# Patient Record
Sex: FEMALE | Race: WHITE | NOT HISPANIC OR LATINO | Employment: OTHER | URBAN - METROPOLITAN AREA
[De-identification: names, ages, dates, MRNs, and addresses within clinical notes are randomized per-mention and may not be internally consistent; named-entity substitution may affect disease eponyms.]

---

## 2022-01-01 ENCOUNTER — PATIENT OUTREACH (OUTPATIENT)
Dept: ONCOLOGY | Facility: MEDICAL CENTER | Age: 72
End: 2022-01-01
Payer: MEDICARE

## 2022-01-01 ENCOUNTER — TELEPHONE (OUTPATIENT)
Dept: HEMATOLOGY ONCOLOGY | Facility: MEDICAL CENTER | Age: 72
End: 2022-01-01

## 2022-01-01 ENCOUNTER — HOSPITAL ENCOUNTER (OUTPATIENT)
Dept: HEMATOLOGY ONCOLOGY | Facility: MEDICAL CENTER | Age: 72
End: 2022-10-26
Attending: INTERNAL MEDICINE
Payer: MEDICARE

## 2022-01-01 ENCOUNTER — HOSPITAL ENCOUNTER (OUTPATIENT)
Dept: RADIOLOGY | Facility: MEDICAL CENTER | Age: 72
End: 2022-10-03
Attending: INTERNAL MEDICINE
Payer: MEDICARE

## 2022-01-01 ENCOUNTER — PHYSICAL THERAPY (OUTPATIENT)
Dept: PHYSICAL THERAPY | Facility: REHABILITATION | Age: 72
End: 2022-01-01
Attending: INTERNAL MEDICINE
Payer: MEDICARE

## 2022-01-01 ENCOUNTER — HOSPITAL ENCOUNTER (OUTPATIENT)
Dept: LAB | Facility: MEDICAL CENTER | Age: 72
End: 2022-12-29
Attending: NURSE PRACTITIONER
Payer: MEDICARE

## 2022-01-01 ENCOUNTER — HOSPITAL ENCOUNTER (OUTPATIENT)
Dept: LAB | Facility: MEDICAL CENTER | Age: 72
End: 2022-11-28
Attending: INTERNAL MEDICINE
Payer: MEDICARE

## 2022-01-01 ENCOUNTER — TELEPHONE (OUTPATIENT)
Dept: HEMATOLOGY ONCOLOGY | Facility: MEDICAL CENTER | Age: 72
End: 2022-01-01
Payer: MEDICARE

## 2022-01-01 ENCOUNTER — HOSPITAL ENCOUNTER (OUTPATIENT)
Dept: HEMATOLOGY ONCOLOGY | Facility: MEDICAL CENTER | Age: 72
End: 2022-09-28
Attending: INTERNAL MEDICINE
Payer: MEDICARE

## 2022-01-01 ENCOUNTER — PATIENT MESSAGE (OUTPATIENT)
Dept: HEALTH INFORMATION MANAGEMENT | Facility: OTHER | Age: 72
End: 2022-01-01

## 2022-01-01 ENCOUNTER — HOSPITAL ENCOUNTER (OUTPATIENT)
Dept: HEMATOLOGY ONCOLOGY | Facility: MEDICAL CENTER | Age: 72
End: 2022-12-02
Attending: INTERNAL MEDICINE
Payer: MEDICARE

## 2022-01-01 ENCOUNTER — HOSPITAL ENCOUNTER (OUTPATIENT)
Dept: LAB | Facility: MEDICAL CENTER | Age: 72
End: 2022-10-24
Attending: INTERNAL MEDICINE
Payer: MEDICARE

## 2022-01-01 VITALS
SYSTOLIC BLOOD PRESSURE: 130 MMHG | HEIGHT: 61 IN | TEMPERATURE: 97.4 F | DIASTOLIC BLOOD PRESSURE: 72 MMHG | BODY MASS INDEX: 44.64 KG/M2 | RESPIRATION RATE: 16 BRPM | WEIGHT: 236.44 LBS | OXYGEN SATURATION: 96 % | HEART RATE: 59 BPM

## 2022-01-01 VITALS
RESPIRATION RATE: 18 BRPM | WEIGHT: 237.44 LBS | DIASTOLIC BLOOD PRESSURE: 80 MMHG | OXYGEN SATURATION: 97 % | SYSTOLIC BLOOD PRESSURE: 142 MMHG | HEART RATE: 67 BPM | TEMPERATURE: 96.9 F | HEIGHT: 61 IN | BODY MASS INDEX: 44.83 KG/M2

## 2022-01-01 VITALS
HEIGHT: 61 IN | WEIGHT: 238.32 LBS | DIASTOLIC BLOOD PRESSURE: 91 MMHG | SYSTOLIC BLOOD PRESSURE: 146 MMHG | RESPIRATION RATE: 18 BRPM | BODY MASS INDEX: 44.99 KG/M2 | TEMPERATURE: 98.2 F | HEART RATE: 77 BPM | OXYGEN SATURATION: 97 %

## 2022-01-01 DIAGNOSIS — C79.51 MALIGNANT NEOPLASM OF BREAST METASTATIC TO BONE (HCC): ICD-10-CM

## 2022-01-01 DIAGNOSIS — I89.0 LYMPHEDEMA: ICD-10-CM

## 2022-01-01 DIAGNOSIS — C50.919 MALIGNANT NEOPLASM OF BREAST METASTATIC TO BONE (HCC): Primary | ICD-10-CM

## 2022-01-01 DIAGNOSIS — I82.A22: ICD-10-CM

## 2022-01-01 DIAGNOSIS — C50.919 MALIGNANT NEOPLASM OF BREAST METASTATIC TO BONE (HCC): ICD-10-CM

## 2022-01-01 DIAGNOSIS — C79.51 MALIGNANT NEOPLASM OF BREAST METASTATIC TO BONE (HCC): Primary | ICD-10-CM

## 2022-01-01 DIAGNOSIS — J40 BRONCHITIS: ICD-10-CM

## 2022-01-01 LAB
ALBUMIN SERPL BCP-MCNC: 3.9 G/DL (ref 3.2–4.9)
ALBUMIN SERPL BCP-MCNC: 4 G/DL (ref 3.2–4.9)
ALBUMIN SERPL BCP-MCNC: 4.1 G/DL (ref 3.2–4.9)
ALBUMIN/GLOB SERPL: 1.4 G/DL
ALBUMIN/GLOB SERPL: 1.5 G/DL
ALBUMIN/GLOB SERPL: 1.5 G/DL
ALP SERPL-CCNC: 109 U/L (ref 30–99)
ALP SERPL-CCNC: 94 U/L (ref 30–99)
ALP SERPL-CCNC: 98 U/L (ref 30–99)
ALT SERPL-CCNC: 17 U/L (ref 2–50)
ALT SERPL-CCNC: 20 U/L (ref 2–50)
ALT SERPL-CCNC: 21 U/L (ref 2–50)
ANION GAP SERPL CALC-SCNC: 12 MMOL/L (ref 7–16)
ANION GAP SERPL CALC-SCNC: 13 MMOL/L (ref 7–16)
ANION GAP SERPL CALC-SCNC: 14 MMOL/L (ref 7–16)
ANISOCYTOSIS BLD QL SMEAR: ABNORMAL
AST SERPL-CCNC: 32 U/L (ref 12–45)
AST SERPL-CCNC: 37 U/L (ref 12–45)
AST SERPL-CCNC: 40 U/L (ref 12–45)
BASOPHILS # BLD AUTO: 0.9 % (ref 0–1.8)
BASOPHILS # BLD AUTO: 2.4 % (ref 0–1.8)
BASOPHILS # BLD AUTO: 3.6 % (ref 0–1.8)
BASOPHILS # BLD: 0.02 K/UL (ref 0–0.12)
BASOPHILS # BLD: 0.05 K/UL (ref 0–0.12)
BASOPHILS # BLD: 0.09 K/UL (ref 0–0.12)
BILIRUB SERPL-MCNC: 0.6 MG/DL (ref 0.1–1.5)
BILIRUB SERPL-MCNC: 0.7 MG/DL (ref 0.1–1.5)
BILIRUB SERPL-MCNC: 0.7 MG/DL (ref 0.1–1.5)
BUN SERPL-MCNC: 20 MG/DL (ref 8–22)
BUN SERPL-MCNC: 23 MG/DL (ref 8–22)
BUN SERPL-MCNC: 24 MG/DL (ref 8–22)
CALCIUM ALBUM COR SERPL-MCNC: 9.4 MG/DL (ref 8.5–10.5)
CALCIUM SERPL-MCNC: 9.2 MG/DL (ref 8.5–10.5)
CALCIUM SERPL-MCNC: 9.3 MG/DL (ref 8.5–10.5)
CALCIUM SERPL-MCNC: 9.4 MG/DL (ref 8.5–10.5)
CANCER AG27-29 SERPL-ACNC: 2308.1 U/ML
CANCER AG27-29 SERPL-ACNC: 2468.1 U/ML
CANCER AG27-29 SERPL-ACNC: 3415.8 U/ML
CEA SERPL-MCNC: 105 NG/ML (ref 0–3)
CEA SERPL-MCNC: 95.7 NG/ML (ref 0–3)
CHLORIDE SERPL-SCNC: 101 MMOL/L (ref 96–112)
CHLORIDE SERPL-SCNC: 101 MMOL/L (ref 96–112)
CHLORIDE SERPL-SCNC: 102 MMOL/L (ref 96–112)
CO2 SERPL-SCNC: 23 MMOL/L (ref 20–33)
CO2 SERPL-SCNC: 23 MMOL/L (ref 20–33)
CO2 SERPL-SCNC: 24 MMOL/L (ref 20–33)
CREAT SERPL-MCNC: 0.97 MG/DL (ref 0.5–1.4)
CREAT SERPL-MCNC: 0.99 MG/DL (ref 0.5–1.4)
CREAT SERPL-MCNC: 1.19 MG/DL (ref 0.5–1.4)
EOSINOPHIL # BLD AUTO: 0 K/UL (ref 0–0.51)
EOSINOPHIL # BLD AUTO: 0.05 K/UL (ref 0–0.51)
EOSINOPHIL # BLD AUTO: 0.11 K/UL (ref 0–0.51)
EOSINOPHIL NFR BLD: 0 % (ref 0–6.9)
EOSINOPHIL NFR BLD: 2.5 % (ref 0–6.9)
EOSINOPHIL NFR BLD: 4.4 % (ref 0–6.9)
ERYTHROCYTE [DISTWIDTH] IN BLOOD BY AUTOMATED COUNT: 64.3 FL (ref 35.9–50)
ERYTHROCYTE [DISTWIDTH] IN BLOOD BY AUTOMATED COUNT: 64.4 FL (ref 35.9–50)
ERYTHROCYTE [DISTWIDTH] IN BLOOD BY AUTOMATED COUNT: 65.7 FL (ref 35.9–50)
FASTING STATUS PATIENT QL REPORTED: NORMAL
GFR SERPLBLD CREATININE-BSD FMLA CKD-EPI: 48 ML/MIN/1.73 M 2
GFR SERPLBLD CREATININE-BSD FMLA CKD-EPI: 60 ML/MIN/1.73 M 2
GFR SERPLBLD CREATININE-BSD FMLA CKD-EPI: 62 ML/MIN/1.73 M 2
GLOBULIN SER CALC-MCNC: 2.6 G/DL (ref 1.9–3.5)
GLOBULIN SER CALC-MCNC: 2.8 G/DL (ref 1.9–3.5)
GLOBULIN SER CALC-MCNC: 2.8 G/DL (ref 1.9–3.5)
GLUCOSE SERPL-MCNC: 89 MG/DL (ref 65–99)
GLUCOSE SERPL-MCNC: 91 MG/DL (ref 65–99)
GLUCOSE SERPL-MCNC: 92 MG/DL (ref 65–99)
HCT VFR BLD AUTO: 30.6 % (ref 37–47)
HCT VFR BLD AUTO: 31.1 % (ref 37–47)
HCT VFR BLD AUTO: 33.6 % (ref 37–47)
HGB BLD-MCNC: 10.3 G/DL (ref 12–16)
HGB BLD-MCNC: 10.5 G/DL (ref 12–16)
HGB BLD-MCNC: 11.1 G/DL (ref 12–16)
LG PLATELETS BLD QL SMEAR: NORMAL
LYMPHOCYTES # BLD AUTO: 0.63 K/UL (ref 1–4.8)
LYMPHOCYTES # BLD AUTO: 0.8 K/UL (ref 1–4.8)
LYMPHOCYTES # BLD AUTO: 0.84 K/UL (ref 1–4.8)
LYMPHOCYTES NFR BLD: 24.1 % (ref 22–41)
LYMPHOCYTES NFR BLD: 30.7 % (ref 22–41)
LYMPHOCYTES NFR BLD: 39.8 % (ref 22–41)
MACROCYTES BLD QL SMEAR: ABNORMAL
MANUAL DIFF BLD: NORMAL
MCH RBC QN AUTO: 35.8 PG (ref 27–33)
MCH RBC QN AUTO: 36.6 PG (ref 27–33)
MCH RBC QN AUTO: 36.8 PG (ref 27–33)
MCHC RBC AUTO-ENTMCNC: 33 G/DL (ref 33.6–35)
MCHC RBC AUTO-ENTMCNC: 33.7 G/DL (ref 33.6–35)
MCHC RBC AUTO-ENTMCNC: 33.8 G/DL (ref 33.6–35)
MCV RBC AUTO: 108.4 FL (ref 81.4–97.8)
MCV RBC AUTO: 108.4 FL (ref 81.4–97.8)
MCV RBC AUTO: 109.3 FL (ref 81.4–97.8)
MICROCYTES BLD QL SMEAR: ABNORMAL
MONOCYTES # BLD AUTO: 0.09 K/UL (ref 0–0.85)
MONOCYTES # BLD AUTO: 0.09 K/UL (ref 0–0.85)
MONOCYTES # BLD AUTO: 0.12 K/UL (ref 0–0.85)
MONOCYTES NFR BLD AUTO: 3.5 % (ref 0–13.4)
MONOCYTES NFR BLD AUTO: 3.6 % (ref 0–13.4)
MONOCYTES NFR BLD AUTO: 5.7 % (ref 0–13.4)
MORPHOLOGY BLD-IMP: NORMAL
NEUTROPHILS # BLD AUTO: 1.04 K/UL (ref 2–7.15)
NEUTROPHILS # BLD AUTO: 1.57 K/UL (ref 2–7.15)
NEUTROPHILS # BLD AUTO: 1.79 K/UL (ref 2–7.15)
NEUTROPHILS NFR BLD: 49.6 % (ref 44–72)
NEUTROPHILS NFR BLD: 60.5 % (ref 44–72)
NEUTROPHILS NFR BLD: 68.7 % (ref 44–72)
NRBC # BLD AUTO: 0 K/UL
NRBC BLD-RTO: 0 /100 WBC
PLATELET # BLD AUTO: 155 K/UL (ref 164–446)
PLATELET # BLD AUTO: 157 K/UL (ref 164–446)
PLATELET # BLD AUTO: 260 K/UL (ref 164–446)
PLATELET BLD QL SMEAR: NORMAL
PMV BLD AUTO: 10.1 FL (ref 9–12.9)
PMV BLD AUTO: 10.6 FL (ref 9–12.9)
PMV BLD AUTO: 10.6 FL (ref 9–12.9)
POLYCHROMASIA BLD QL SMEAR: NORMAL
POTASSIUM SERPL-SCNC: 4 MMOL/L (ref 3.6–5.5)
POTASSIUM SERPL-SCNC: 4.2 MMOL/L (ref 3.6–5.5)
POTASSIUM SERPL-SCNC: 4.3 MMOL/L (ref 3.6–5.5)
PROT SERPL-MCNC: 6.6 G/DL (ref 6–8.2)
PROT SERPL-MCNC: 6.7 G/DL (ref 6–8.2)
PROT SERPL-MCNC: 6.9 G/DL (ref 6–8.2)
RBC # BLD AUTO: 2.8 M/UL (ref 4.2–5.4)
RBC # BLD AUTO: 2.87 M/UL (ref 4.2–5.4)
RBC # BLD AUTO: 3.1 M/UL (ref 4.2–5.4)
RBC BLD AUTO: PRESENT
SODIUM SERPL-SCNC: 137 MMOL/L (ref 135–145)
SODIUM SERPL-SCNC: 138 MMOL/L (ref 135–145)
SODIUM SERPL-SCNC: 138 MMOL/L (ref 135–145)
WBC # BLD AUTO: 2.1 K/UL (ref 4.8–10.8)
WBC # BLD AUTO: 2.6 K/UL (ref 4.8–10.8)
WBC # BLD AUTO: 2.6 K/UL (ref 4.8–10.8)

## 2022-01-01 PROCEDURE — 36415 COLL VENOUS BLD VENIPUNCTURE: CPT

## 2022-01-01 PROCEDURE — 99212 OFFICE O/P EST SF 10 MIN: CPT | Performed by: INTERNAL MEDICINE

## 2022-01-01 PROCEDURE — 85025 COMPLETE CBC W/AUTO DIFF WBC: CPT

## 2022-01-01 PROCEDURE — 85007 BL SMEAR W/DIFF WBC COUNT: CPT

## 2022-01-01 PROCEDURE — 82378 CARCINOEMBRYONIC ANTIGEN: CPT

## 2022-01-01 PROCEDURE — 80053 COMPREHEN METABOLIC PANEL: CPT

## 2022-01-01 PROCEDURE — 99214 OFFICE O/P EST MOD 30 MIN: CPT | Performed by: INTERNAL MEDICINE

## 2022-01-01 PROCEDURE — 99204 OFFICE O/P NEW MOD 45 MIN: CPT | Performed by: INTERNAL MEDICINE

## 2022-01-01 PROCEDURE — 86300 IMMUNOASSAY TUMOR CA 15-3: CPT

## 2022-01-01 PROCEDURE — 93971 EXTREMITY STUDY: CPT | Mod: LT

## 2022-01-01 PROCEDURE — 97161 PT EVAL LOW COMPLEX 20 MIN: CPT

## 2022-01-01 RX ORDER — PRASTERONE (DHEA) 50 MG
CAPSULE ORAL
COMMUNITY
End: 2023-01-01

## 2022-01-01 RX ORDER — LETROZOLE 2.5 MG/1
2.5 TABLET, FILM COATED ORAL DAILY
Qty: 90 TABLET | Refills: 1 | Status: SHIPPED
Start: 2022-01-01 | End: 2023-01-01

## 2022-01-01 RX ORDER — ASPIRIN 81 MG
1 TABLET, DELAYED RELEASE (ENTERIC COATED) ORAL DAILY
COMMUNITY
End: 2023-01-01

## 2022-01-01 RX ORDER — PROCHLORPERAZINE MALEATE 10 MG
10 TABLET ORAL EVERY 6 HOURS PRN
COMMUNITY
End: 2023-01-01

## 2022-01-01 RX ORDER — ACETAMINOPHEN 500 MG
500-1000 TABLET ORAL EVERY 6 HOURS PRN
COMMUNITY
End: 2023-01-01

## 2022-01-01 RX ORDER — LIDOCAINE HYDROCHLORIDE 40 MG/ML
SOLUTION TOPICAL PRN
COMMUNITY
End: 2023-01-01

## 2022-01-01 RX ORDER — LETROZOLE 2.5 MG/1
2.5 TABLET, FILM COATED ORAL DAILY
COMMUNITY
End: 2022-01-01

## 2022-01-01 RX ORDER — GABAPENTIN 300 MG/1
300 CAPSULE ORAL NIGHTLY
Qty: 90 CAPSULE | Refills: 3 | Status: SHIPPED | OUTPATIENT
Start: 2022-01-01 | End: 2023-12-09

## 2022-01-01 RX ORDER — LETROZOLE 2.5 MG/1
2.5 TABLET, FILM COATED ORAL DAILY
COMMUNITY
Start: 2022-01-01 | End: 2022-01-01

## 2022-01-01 RX ORDER — GABAPENTIN 300 MG/1
300 CAPSULE ORAL 3 TIMES DAILY
COMMUNITY
End: 2022-01-01 | Stop reason: SDUPTHER

## 2022-01-01 RX ORDER — ACETAMINOPHEN 500 MG
600 TABLET ORAL
COMMUNITY
End: 2023-01-01

## 2022-01-01 RX ORDER — LETROZOLE 2.5 MG/1
2.5 TABLET, FILM COATED ORAL DAILY
Qty: 30 TABLET | Refills: 3 | Status: SHIPPED | OUTPATIENT
Start: 2022-01-01 | End: 2022-01-01 | Stop reason: SDUPTHER

## 2022-01-01 RX ORDER — GABAPENTIN 300 MG/1
300 CAPSULE ORAL NIGHTLY
COMMUNITY
Start: 2022-05-25 | End: 2022-01-01 | Stop reason: SDUPTHER

## 2022-01-01 RX ORDER — DIAZEPAM 5 MG/1
5 TABLET ORAL EVERY 6 HOURS PRN
COMMUNITY
End: 2023-01-01 | Stop reason: SDUPTHER

## 2022-01-01 RX ORDER — GABAPENTIN 300 MG/1
300 CAPSULE ORAL 3 TIMES DAILY
Qty: 90 CAPSULE | Refills: 3 | Status: SHIPPED
Start: 2022-01-01 | End: 2023-01-01

## 2022-01-01 RX ORDER — ONDANSETRON 8 MG/1
8 TABLET, ORALLY DISINTEGRATING ORAL EVERY 8 HOURS PRN
COMMUNITY
End: 2023-01-01 | Stop reason: SDUPTHER

## 2022-01-01 RX ORDER — LEVOFLOXACIN 500 MG/1
500 TABLET, FILM COATED ORAL DAILY
Qty: 7 TABLET | Refills: 0 | Status: SHIPPED
Start: 2022-01-01 | End: 2023-01-01

## 2022-01-01 RX ORDER — DIAZEPAM 5 MG/1
5 TABLET ORAL
COMMUNITY
End: 2023-01-01

## 2022-01-01 RX ORDER — ECHINACEA 400 MG
CAPSULE ORAL
COMMUNITY
End: 2023-01-01

## 2022-01-01 RX ORDER — M-VIT,TX,IRON,MINS/CALC/FOLIC 27MG-0.4MG
1 TABLET ORAL DAILY
COMMUNITY
End: 2023-01-01

## 2022-01-01 ASSESSMENT — FIBROSIS 4 INDEX
FIB4 SCORE: 2.15
FIB4 SCORE: 4

## 2022-01-01 ASSESSMENT — ENCOUNTER SYMPTOMS
NEUROLOGICAL NEGATIVE: 1
EYES NEGATIVE: 1
COUGH: 1
PSYCHIATRIC NEGATIVE: 1
BACK PAIN: 1
BACK PAIN: 1
NAUSEA: 1
CARDIOVASCULAR NEGATIVE: 1
NECK PAIN: 1
CARDIOVASCULAR NEGATIVE: 1
COUGH: 1
EYES NEGATIVE: 1
PSYCHIATRIC NEGATIVE: 1
EYES NEGATIVE: 1
NECK PAIN: 1
PSYCHIATRIC NEGATIVE: 1
NAUSEA: 1
NAUSEA: 1
BACK PAIN: 1
NEUROLOGICAL NEGATIVE: 1
COUGH: 1
NECK PAIN: 1
CARDIOVASCULAR NEGATIVE: 1
NEUROLOGICAL NEGATIVE: 1

## 2022-01-01 ASSESSMENT — PAIN SCALES - GENERAL
PAINLEVEL: NO PAIN

## 2022-09-28 PROBLEM — I82.A22: Status: ACTIVE | Noted: 2022-01-01

## 2022-09-28 PROBLEM — I89.0 LYMPHEDEMA: Status: ACTIVE | Noted: 2022-01-01

## 2022-09-28 PROBLEM — C50.919 MALIGNANT NEOPLASM OF BREAST METASTATIC TO BONE (HCC): Status: ACTIVE | Noted: 2022-01-01

## 2022-09-28 PROBLEM — C79.51 MALIGNANT NEOPLASM OF BREAST METASTATIC TO BONE (HCC): Status: ACTIVE | Noted: 2022-01-01

## 2022-09-28 NOTE — PROGRESS NOTES
Consult:  Hematology/Oncology      Referring Physician: Taniya Heck MD  Primary Care:  No primary care provider on file.    Diagnosis: Metastatic ER positive breast cancer    Chief Complaint: Metastatic ER positive breast cancer    History of Presenting Illness:  Rodrick Howard is a 72 y.o. female with a history of metastatic ER positive breast cancer who recently moved to [place] to live with her daughter and establish care here.  Her history dates back to 2018 when she had nipple inversion but did not seek medical care until October 2019 when she was found to have a large left breast mass.  Biopsy showed invasive ductal carcinoma lobular features ER/MT positive HER2 negative.  She had a left mastectomy which showed an 8 cm lesion with 13 of 13 lymph nodes positive.  CT scan post surgery showed diffuse bony disease and the patient declined biopsy at that time.  She was started on letrozole which she was maintained on until January 2021 when she was found to have progressive disease with increasing tumor markers.  She was then started on Faslodex and tolerated this very poorly and this was rapidly discontinued.  She went on tamoxifen for 1 year from February 2021 to [place] 2022 and had further progression in the bone and rising CA 27-29.  She restarted letrozole and Ibrance in February 2022 and has been doing reasonably well subsequently on 100 mg of Ibrance 2 out of 4 weeks.  In preparation for the moved to [place] she had recent reimaging including a bone scan on 9/7/2022 which showed hayden stable osseous metastases in the ribs sternum calvarium femurs and proximal humeri and thoracic spine.  Had a CT scan of the chest abdomen pelvis that time which showed a 3 mm nodule in the posterior right upper lobe and right axillary adenopathy which is smaller.  Diffuse sclerotic bony metastases have not changed.  Her 2729 has been informative and had been rising but apparently the last 1 taken in early September  "showed a drop of 400 points from a high of around 2000.  She has a history of a left axillary vein clot and is on Eliquis for this. she also has left arm lymphedema which has not been evaluated lately.  She does not take any narcotic pain medication and tries to use as little medication as possible in general.  She has severe anxiety and distress over the move and takes an occasional Valium for this.  A [employer] comprehensive profile for genomics was done earlier this year and showed ER positivity and a PI 3 kinase mutation potentially actionable by Pi[employer].      History reviewed. No pertinent past medical history.    History reviewed. No pertinent surgical history.          History reviewed. No pertinent family history.    Allergies as of 09/28/2022    (Not on File)       No current outpatient medications on file.    Review of Systems:  Review of Systems   Constitutional:  Positive for malaise/fatigue.   HENT: Negative.     Eyes: Negative.    Respiratory:  Positive for cough.    Cardiovascular: Negative.    Gastrointestinal:  Positive for nausea.   Genitourinary: Negative.    Musculoskeletal:  Positive for back pain and neck pain.   Skin: Negative.    Neurological: Negative.    Endo/Heme/Allergies: Negative.    Psychiatric/Behavioral: Negative.          Physical Exam:  Vitals:    09/28/22 1553   BP: (!) 146/91   Pulse: 77   Resp: 18   Temp: 36.8 °C (98.2 °F)   TempSrc: Temporal   SpO2: 97%   Weight: 108 kg (238 lb 5.1 oz)   Height: 1.549 m (5' 1\")       DESC; KARNOFSKY SCALE WITH ECOG EQUIVALENT: 70, Cares for self; unable to carry on normal activity or to do active work (ECOG equivalent 1)    DISTRESS LEVEL: severe distress    Physical Exam     Detailed physical exam was not performed today    Labs:  No visits with results within 1 Month(s) from this visit.   Latest known visit with results is:   No results found for any previous visit.       Imaging:   All listed images below have been independently reviewed by me. I " agree with the findings as summarized below:    CT ABDOMEN-PELVIS WITH IV CONTRAST    Result Date: 9/9/2022  CT CHEST ABDOMEN PELVIS W IV CONTRAST  9/9/2022 1:38 PM PROVIDED CLINICAL INDICATIONS:  metastatic breast cancer, restaging Malignant neoplasm of upper-outer quadrant of left female breast (HCC); Estrogen receptor positive status (ER+) ADDITIONAL CLINICAL HISTORY:  None. COMPARISON: February 3, 2022 and July 20, 2021. TECHNIQUE: Contiguous axial imaging of the chest, abdomen, and pelvis was performed with spiral technique after the administration of intravenous contrast. The images were reformatted in sagittal and coronal plane.  Dose reduction techniques were used including but not limited to automated exposure control (AEC), iterative reconstruction technique, and/or mA and/or KV dose adjustments based on patient's size.  FINDINGS: CHEST: Heart: Normal size. No pericardial effusion. Aorta: Nonaneurysmal. Anatomic origination of the great vessels from the aortic arch. Mediastinum and leslie: No mediastinal or hilar adenopathy. Trachea and esophagus are normal. Thyroid is enlarged and shows heterogeneous attenuation with scattered nodules, unchanged from July 2021. Lungs: 3 mm nodule in the posterior right upper lobe is new from the previous study. No additional lung nodules. No infiltrate. Pleura: Small right pleural effusion is new from the previous exam. ABDOMEN: Liver: Low-attenuation is present throughout. No liver lesions. Portal vein is patent. Gallbladder and common bile duct: No calcified gallstones. Common bile duct has a normal caliber. Pancreas: Homogeneous enhancement. No peripancreatic fat stranding or fluid collections. Spleen: Normal size and homogeneous enhancement. Adrenal glands: Normal. Kidneys: Symmetric with homogeneous enhancement. No hydronephrosis. Aorta and IVC: Normal caliber. Stomach and bowel: Stomach is unremarkable. Small and large bowel are nondilated. No pericolic fat stranding.  PELVIS: Retroperitoneum and mesentery: No mass or adenopathy. Peritoneum: No free intraperitoneal fluid or pneumoperitoneum. No mass. Genitourinary: Urinary bladder has a smooth contour. No bladder calculus. Uterus is unremarkable. Body wall: Previous left mastectomy and left axillary lymph node dissection. Prominent lymph nodes in the right axilla have increased in size significantly measuring up to 10 mm in short axis. Bones: Diffuse sclerotic bony metastases have not changed significantly.    IMPRESSION:  Widespread sclerotic bony metastatic disease has not changed significantly. Noncalcified nodule in the right upper lobe and mildly enlarged right axillary lymph nodes are new from prior. Previous left mastectomy and left axillary lymph node dissection. Small right pleural effusion. Hepatic steatosis.    NM bone scan whole body    Result Date: 9/7/2022  NM BONE SCAN WHOLE BODY 9/7/2022 1:41 PM PROVIDED CLINICAL INDICATIONS:  metastatic breast cancer restaging Malignant neoplasm of upper-outer quadrant of left female breast (HCC); Estrogen receptor positive status (ER+) ADDITIONAL CLINICAL HISTORY:  None. COMPARISON:  Nuclear medicine bone scan dated 2/7/2022. TECHNIQUE: Whole body anterior and posterior planar images are obtained at 3 hours following intravenous administration of 25.2 mCi Tc-99m labeled MDP. FINDINGS:  Stable radiotracer uptake within the bilateral posterior ribs, sternum, calvarium, femurs and proximal humeri. Stable radiotracer uptake within the lower thoracic spine.    IMPRESSION:  Stable osseous metastases.       Pathology:      Assessment & Plan:  1.  Metastatic ER positive breast cancer with extensive bone metastases, currently on letrozole and Ibrance with imaging stable disease and some fluctuation in tumor markers.  At this time my inclination is to continue her on this therapy.  She does not have new symptoms associated with progression.  2.  Left arm lymphedema.  To have evaluation  3.   History of left venous thrombosis in the axillary area.  To be reevaluated with ultrasound  4.  Situational anxiety.  She will use Valium at her discretion     plan I will see her back in 4 weeks and recheck labs at that time as well as the results of the other studies.  We will likely get a defined MBC at that time as well.      Any questions and concerns raised by the patient were answered to the best of my ability. Thank you for allowing me to participate in the care for this patient. Please feel free to contact me for any questions or concerns.     Nathan Arredondo M.D.

## 2022-09-29 NOTE — ADDENDUM NOTE
Encounter addended by: Chanell Vanessa, Med Ass't on: 9/29/2022 10:19 AM   Actions taken: Order Reconciliation Section accessed, Order list changed, Home Medications modified, Allergies modified, Allergies reviewed, Medication List reviewed

## 2022-09-29 NOTE — ADDENDUM NOTE
Encounter addended by: Chanell Vanessa, Med Ass't on: 9/29/2022 10:09 AM   Actions taken: Order Reconciliation Section accessed, Medication List reviewed, Home Medications modified

## 2022-09-29 NOTE — TELEPHONE ENCOUNTER
Patient states she was supposed to get the ultrasound done prior to 10/5/22, but the soonest they can schedule her is 10/20/22.      Also, her next visit is 10/26/22, and she is supposed to get blood work done prior, but does not have anything scheduled.  Were you going to make an appointment for her?    Patient is okay with replying via her My Chart.

## 2022-10-05 NOTE — TELEPHONE ENCOUNTER
Returned call to pt informing her that the US results will need to be reviewed by Dr. Arredondo (as he ordered it) & can let the pt know what his plan is for pt continuing or stopping Eliquis.  Informed pt that Dr. Arredondo is not expected back in office until Friday 10/7.  Pt appreciative of call back & stated no further questions.

## 2022-10-05 NOTE — TELEPHONE ENCOUNTER
Patient got her ultrasound results.  She no longer has a blood clot, and is wanting to get off of the blood thinner.  How should she go about doing this?

## 2022-10-12 NOTE — TELEPHONE ENCOUNTER
Patient called asking about a referral for lymphedema. Upon review of chart, a referral to PT was sent late September, patient was provided with phone number to schedule.     Patient further inquired about financial resource, advised a referral will be sent per her request

## 2022-10-26 NOTE — PROGRESS NOTES
Follow-up medical oncology: 10/26/2022      Referring Physician: Taniya Heck MD  Primary Care:  No primary care provider on file.    Diagnosis: Metastatic ER positive breast cancer    Chief Complaint: Metastatic ER positive breast cancer    History of Presenting Illness:  Rodrick Howard is a 72 y.o. female with a history of metastatic ER positive breast cancer who recently moved to [place] to live with her daughter and establish care here.  Her history dates back to 2018 when she had nipple inversion but did not seek medical care until October 2019 when she was found to have a large left breast mass.  Biopsy showed invasive ductal carcinoma lobular features ER/LA positive HER2 negative.  She had a left mastectomy which showed an 8 cm lesion with 13 of 13 lymph nodes positive.  CT scan post surgery showed diffuse bony disease and the patient declined biopsy at that time.  She was started on letrozole which she was maintained on until January 2021 when she was found to have progressive disease with increasing tumor markers.  She was then started on Faslodex and tolerated this very poorly and this was rapidly discontinued.  She went on tamoxifen for 1 year from February 2021 to [place] 2022 and had further progression in the bone and rising CA 27-29.  She restarted letrozole and Ibrance in February 2022 and has been doing reasonably well subsequently on 100 mg of Ibrance 2 out of 4 weeks.  In preparation for the moved to [place] she had recent reimaging including a bone scan on 9/7/2022 which showed hayden stable osseous metastases in the ribs sternum calvarium femurs and proximal humeri and thoracic spine.  Had a CT scan of the chest abdomen pelvis that time which showed a 3 mm nodule in the posterior right upper lobe and right axillary adenopathy which is smaller.  Diffuse sclerotic bony metastases have not changed.  Her 2729 has been informative and had been rising but apparently the last 1 taken in early  September showed a drop of 400 points from a high of around 2000.  She has a history of a left axillary vein clot and is on Eliquis for this. she also has left arm lymphedema which has not been evaluated lately.  She does not take any narcotic pain medication and tries to use as little medication as possible in general.  She has severe anxiety and distress over the move and takes an occasional Valium for this.  A [employer] comprehensive profile for genomics was done earlier this year and showed ER positivity and a PI 3 kinase mutation potentially actionable by [employer].    Interval history 10/26/2022: She had some difficulty getting her Ibrance to her office but this was finally solved and she is back on schedule with 3-week on 1 week off.  In the past she has occasionally had to delay an extra week for fatigue and nausea but so far she is doing well with this cycle.  Lab tests with CBC are stable with her CA 27-29 2300 essentially stable.  We repeated the ultrasound on the left arm which showed patent veins so Eliquis was discontinued.  She went to lymphedema treatment and is now using a sleeve and compression massages to help her lymphedema.      History reviewed. No pertinent past medical history.    History reviewed. No pertinent surgical history.          History reviewed. No pertinent family history.    Allergies as of 10/26/2022 - Reviewed 10/26/2022   Allergen Reaction Noted   • Codeine Vomiting and Nausea 09/29/2022   • Hydrocodone Vomiting and Nausea 09/29/2022   • Morphine Unspecified 09/29/2022   • Oxycodone Vomiting and Nausea 09/29/2022         Current Outpatient Medications:   •  Blaire 500 MG Cap, by Other route., Disp: , Rfl:   •  acetaminophen (TYLENOL) 500 MG Tab, Take 600 mg by mouth., Disp: , Rfl:   •  apixaban (ELIQUIS) 2.5mg Tab, Take 1 Tablet by mouth every 12 hours. (Patient not taking: Reported on 10/26/2022), Disp: , Rfl:   •  Black Elderberry,Berry-Flower, 575 MG Cap, Take  by mouth., Disp: ,  Rfl:   •  diazePAM (VALIUM) 5 MG Tab, Take 5 mg by mouth., Disp: , Rfl:   •  gabapentin (NEURONTIN) 300 MG Cap, Take 300 mg by mouth every evening., Disp: , Rfl:   •  letrozole (FEMARA) 2.5 MG Tab, Take 2.5 mg by mouth every day., Disp: , Rfl:   •  Multiple Vitamins-Minerals (THERA-M) Tab, Take 1 Tablet by mouth every day., Disp: , Rfl:   •  Palbociclib 100 MG Tab, Take 100 mg by mouth every day., Disp: , Rfl:   •  diazePAM (VALIUM) 5 MG Tab, Take 5 mg by mouth every 6 hours as needed for Anxiety., Disp: , Rfl:   •  acetaminophen (TYLENOL) 500 MG Tab, Take 500-1,000 mg by mouth every 6 hours as needed., Disp: , Rfl:   •  lidocaine (XYLOCAINE) 4 % Solution, Apply  topically as needed., Disp: , Rfl:   •  therapeutic multivitamin-minerals (THERAGRAN-M) Tab, Take 1 Tablet by mouth every day., Disp: , Rfl:   •  Elderberry 500 MG Cap, Take  by mouth., Disp: , Rfl:   •  ondansetron (ZOFRAN ODT) 8 MG TABLET DISPERSIBLE, Take 8 mg by mouth every 8 hours as needed for Nausea., Disp: , Rfl:   •  prochlorperazine (COMPAZINE) 10 MG Tab, Take 10 mg by mouth every 6 hours as needed., Disp: , Rfl:   •  Ginger 500 MG Cap, Take  by mouth., Disp: , Rfl:   •  gabapentin (NEURONTIN) 300 MG Cap, Take 300 mg by mouth 3 times a day., Disp: , Rfl:   •  apixaban (ELIQUIS) 2.5mg Tab, Take 2.5 mg by mouth 2 times a day. (Patient not taking: Reported on 10/26/2022), Disp: , Rfl:   •  Palbociclib 100 MG Cap, Take  by mouth., Disp: , Rfl:   •  letrozole (FEMARA) 2.5 MG Tab, Take 2.5 mg by mouth every day., Disp: , Rfl:     Review of Systems:  Review of Systems   Constitutional:  Positive for malaise/fatigue.   HENT: Negative.     Eyes: Negative.    Respiratory:  Positive for cough.    Cardiovascular: Negative.    Gastrointestinal:  Positive for nausea.   Genitourinary: Negative.    Musculoskeletal:  Positive for back pain and neck pain.   Skin: Negative.    Neurological: Negative.    Endo/Heme/Allergies: Negative.    Psychiatric/Behavioral:  "Negative.          Physical Exam:  Vitals:    10/26/22 0927   BP: 130/72   BP Location: Right arm   Patient Position: Sitting   BP Cuff Size: Large adult   Pulse: (!) 59   Resp: 16   Temp: 36.3 °C (97.4 °F)   TempSrc: Temporal   SpO2: 96%   Weight: 107 kg (236 lb 7.1 oz)   Height: 1.549 m (5' 0.98\")       DESC; KARNOFSKY SCALE WITH ECOG EQUIVALENT: 70, Cares for self; unable to carry on normal activity or to do active work (ECOG equivalent 1)    DISTRESS LEVEL: severe distress    Physical Exam     Detailed physical exam was not performed today    Labs:  No visits with results within 1 Month(s) from this visit.   Latest known visit with results is:   No results found for any previous visit.       Imaging:   All listed images below have been independently reviewed by me. I agree with the findings as summarized below:    CT ABDOMEN-PELVIS WITH IV CONTRAST    Result Date: 9/9/2022  CT CHEST ABDOMEN PELVIS W IV CONTRAST  9/9/2022 1:38 PM PROVIDED CLINICAL INDICATIONS:  metastatic breast cancer, restaging Malignant neoplasm of upper-outer quadrant of left female breast (HCC); Estrogen receptor positive status (ER+) ADDITIONAL CLINICAL HISTORY:  None. COMPARISON: February 3, 2022 and July 20, 2021. TECHNIQUE: Contiguous axial imaging of the chest, abdomen, and pelvis was performed with spiral technique after the administration of intravenous contrast. The images were reformatted in sagittal and coronal plane.  Dose reduction techniques were used including but not limited to automated exposure control (AEC), iterative reconstruction technique, and/or mA and/or KV dose adjustments based on patient's size.  FINDINGS: CHEST: Heart: Normal size. No pericardial effusion. Aorta: Nonaneurysmal. Anatomic origination of the great vessels from the aortic arch. Mediastinum and leslie: No mediastinal or hilar adenopathy. Trachea and esophagus are normal. Thyroid is enlarged and shows heterogeneous attenuation with scattered nodules, " unchanged from July 2021. Lungs: 3 mm nodule in the posterior right upper lobe is new from the previous study. No additional lung nodules. No infiltrate. Pleura: Small right pleural effusion is new from the previous exam. ABDOMEN: Liver: Low-attenuation is present throughout. No liver lesions. Portal vein is patent. Gallbladder and common bile duct: No calcified gallstones. Common bile duct has a normal caliber. Pancreas: Homogeneous enhancement. No peripancreatic fat stranding or fluid collections. Spleen: Normal size and homogeneous enhancement. Adrenal glands: Normal. Kidneys: Symmetric with homogeneous enhancement. No hydronephrosis. Aorta and IVC: Normal caliber. Stomach and bowel: Stomach is unremarkable. Small and large bowel are nondilated. No pericolic fat stranding. PELVIS: Retroperitoneum and mesentery: No mass or adenopathy. Peritoneum: No free intraperitoneal fluid or pneumoperitoneum. No mass. Genitourinary: Urinary bladder has a smooth contour. No bladder calculus. Uterus is unremarkable. Body wall: Previous left mastectomy and left axillary lymph node dissection. Prominent lymph nodes in the right axilla have increased in size significantly measuring up to 10 mm in short axis. Bones: Diffuse sclerotic bony metastases have not changed significantly.    IMPRESSION:  Widespread sclerotic bony metastatic disease has not changed significantly. Noncalcified nodule in the right upper lobe and mildly enlarged right axillary lymph nodes are new from prior. Previous left mastectomy and left axillary lymph node dissection. Small right pleural effusion. Hepatic steatosis.    NM bone scan whole body    Result Date: 9/7/2022  NM BONE SCAN WHOLE BODY 9/7/2022 1:41 PM PROVIDED CLINICAL INDICATIONS:  metastatic breast cancer restaging Malignant neoplasm of upper-outer quadrant of left female breast (HCC); Estrogen receptor positive status (ER+) ADDITIONAL CLINICAL HISTORY:  None. COMPARISON:  Nuclear medicine bone  scan dated 2/7/2022. TECHNIQUE: Whole body anterior and posterior planar images are obtained at 3 hours following intravenous administration of 25.2 mCi Tc-99m labeled MDP. FINDINGS:  Stable radiotracer uptake within the bilateral posterior ribs, sternum, calvarium, femurs and proximal humeri. Stable radiotracer uptake within the lower thoracic spine.    IMPRESSION:  Stable osseous metastases.       Pathology:      Assessment & Plan:  1.  Metastatic ER positive breast cancer with extensive bone metastases, currently on letrozole and Ibrance with imaging stable disease and some fluctuation in tumor markers.  At this time my inclination is to continue her on this therapy.  She does not have new symptoms associated with progression.  2.  Left arm lymphedema.  Receiving lymphedema treatment  3.  History of left venous thrombosis in the axillary area.  Negative ultrasound 4.  Situational anxiety.  She will use Valium at her discretion     plan continue letrozole and Ibrance.  We will see her back in about 5 weeks and recheck labs again at that time.    Any questions and concerns raised by the patient were answered to the best of my ability. Thank you for allowing me to participate in the care for this patient. Please feel free to contact me for any questions or concerns.     Nathan Arredondo M.D.

## 2022-10-27 NOTE — OP THERAPY EVALUATION
"  Outpatient Physical Therapy  LYMPHEDEMA THERAPY INITIAL EVALUATION    [facility] [address]  [address]  [place] [address]  Phone:  [phone]  Fax:  [phone]    Date of Evaluation: 10/27/2022    Patient: Rodrick Howard  YOB: 1950  MRN: [id number]     Referring Provider: Nathan Arredondo M.D.  [address] [facility] [address]  [place] [address]   Referring Diagnosis Lymphedema [I89.0]     Time Calculation    Start time: 1546  Stop time: 1630 Time Calculation (min): 44 minutes             Chief Complaint: Lymphedema Breast Cancer    Visit Diagnoses     ICD-10-CM   1. Lymphedema  I89.0       Subjective:   History of Present Illness:     Mechanism of injury:  Per chart: \"Biopsy showed invasive ductal carcinoma lobular features ER/SD positive HER2 negative.  She had a left mastectomy which showed an 8 cm lesion with 13 of 13 lymph nodes positive....recent reimaging including a bone scan on 9/7/2022 which showed hayden stable osseous metastases in the ribs sternum calvarium femurs and proximal humeri and thoracic spine.  Had a CT scan of the chest abdomen pelvis that time which showed a 3 mm nodule in the posterior right upper lobe and right axillary adenopathy which is smaller.  Diffuse sclerotic bony metastases have not changed.\"      Pt reporting she saw a lymphedema therapist around a year ago and she was able to obtain a sleeve and glove. She was self-maintaining with self-MLD and her glove and sleeve. She reports she wears her glove/sleeve as soon as she wakes up and removes at night. She states as soon as she removes, her arm becomes largely painful. She has restricted ROM at her hand for which she had therapy for. 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 Did try a nighttime garment but found it to be incredibly uncomfortable. Utilizes Lidocaine cream to assist with pain to her L arm. " Of note, pt reports a hx of Reynauds. In addition, it sounds as though pt has nerve damage to her L UE resulting in hand dysfunction such that she cannot flex her DIP and PIP joints.         No past medical history on file.  No past surgical history on file.  Social History     Tobacco Use    Smoking status: Not on file    Smokeless tobacco: Not on file   Substance Use Topics    Alcohol use: Not on file     Family and Occupational History     Socioeconomic History    Marital status:      Spouse name: Not on file    Number of children: Not on file    Years of education: Not on file    Highest education level: Not on file   Occupational History    Not on file       Lymphedema Objective      Left Upper Extremity Circumferential Measurements  Other: cm  Areola: cm  Breast Crease: cm  Axilla: 41.5 cm  Upper Proximal Humerus: 41.2 cm  Distal Humerus: 39.7 cm  Elbow: 30.1 cm  Mid-Forearm: 32 cm  Wrist: 16.2 cm  Distal Silverio Crease: 18.3 cm  Total: 219 cm    Right Upper Extremity Circumferential Measurements  Other: cm  Areola: cm  Breast Crease: cm  Axilla: 41.8 cm  Upper Proximal Humerus: 41.8 cm  Distal Humerus: 36.7 cm  Elbow: 28 cm  Mid-Forearm: 28.1 cm  Wrist: 16 cm  Distal Silverio Crease: 19.1 cm  Total: cm 211.5      Lymphedema Stage  Stage 1 Lymphedema    Other Notes  Current Arm Sleeve: Mediven Comfort regular size 5  [place] is Mediven Ridgeway size III      Therapeutic Treatments and Modalities:     Therapeutic Treatment and Modalities Summary: -Pt has been through CDT before and is familiar with precautions, skin care, and self-MLD.   -discussed timeline for compression sleeve life; advised pt on replacement every 6-9mo depending on use. If daily use and wash, every 4mo.   Time-based treatments/modalities:           Assessment and Plan:   Functional Impairments: limited ADL's and swelling    Assessment details:  Rodrick is a 73yo F with hx of breast cancer and metastases. She arrives today already having been  through [facility] and is seeking to establish lymphedema care to ensure her garments are of appropriate fit and she is continuing to perform all the necessary steps for management. Rodrick has been performing self-MLD regularly and wears her glove and sleeve daily. Unfortunately it does sound as though she has some neurogenic pain to her L UE that has been difficult for her to control, especially once she removes her sleeve. She has tried nighttime compression but has been unable to tolerate it. Rodrick's current glove is looking a bit worn and she has been provided with avenues to order a new one. Will follow up with her in 1-2months to ensure she has appropriate garments and is sustaining her current measurements.   Prognosis: fair      Goals:   Short Term Goals:  1. Pt will sustain her L UE measurement by 3cm in order to limit the amount of fluid remaining in L UE.   2. Pt will obtain new glove to allow for fluid removal to hand.   Short term goal time span:  2-4 weeks    Long Term Goals:  1. Patient will be independent with maintenance phase management of lymphedema including: compression garments, skin care education, risk reduction strategies, manual lymphatic drainage, and therapeutic exercise.   Long term goal time span:  4-6 weeks    Plan:  Therapy options:  Physical therapy treatment to continue  Planned therapy interventions:  Manual lymph drainage, caregiver education, compression bandaging, compression glove, compression sleeve, decongestive exercises, home exercise program, intermittent compression, myofascial release techniques, patient education, postural exercises, range of motion exercises, referral for compression garment & instructions for don/doffing, sequential compression pump, short stretch bandages, skin/wound care, soft tissue manual techniques (CPT 81067), strengthening exercises and Velcro wraps  Planned education:  Functional anatomy and physiology of the lymphatic  system, pathophysiology of lymphedema, lymphedema exercise, lymphedema precautions, proper skin care/nutrition, compression bandaging, self massage, infection prevention, scar tissue management, breast cancer rehab, activity guidelines, dietary guidelines, skin care guidelines, home pump use, bandage removal and long term self-management of lymphedema  Frequency:  1x month  Duration in weeks:  8  Discussed with:  Patient and family    Functional Assessment Used    LLIS    Referring provider co-signature:  I have reviewed this plan of care and my co-signature certifies the need for services.    Certification Period: 10/27/2022 to  12/22/22    Physician Signature: ________________________________ Date: ______________

## 2022-11-01 NOTE — PROGRESS NOTES
"On October 31st, 2022, Oncology Social Worker Brea Prado contacted pt. via telephone.  DWIGHT Prado explained her role and reason why she was calling pt.  Pt. shared she left a \"wonderful oncologist in [place] because I moved in with my daughter here in [place].\"      Pt. shared it has been an adjustment to live with her daughter as pt. shared she is \"very independent.\"    Pt. shared she takes care of all of her needs.  Pt. shared the only \"thing I am not able to do is drive myself.\"      Pt. shared her daughter works in the medical field and also has two friends who are nurses.  Pt. shared she has already spoken to Financial Resource Advocate Fredrick Whitney.      Pt. shared she lost her  who passed away and shared how hard it was to come to live with her daughter.  Pt. shared she is \"a county person not a city person\" and shared she is having a hard time adjusting to living with someone.  Pt. shared she was able to bring her dog and cat to live with her.      DWIGHT Prado spoke to pt. about [employer] and [facility] [employer] transportation jeanna application.  DWIGHT Prado will be mailing pt. these two applications.  DWIGHT Prado verified pt.'s mailing address.    "

## 2022-11-09 NOTE — TELEPHONE ENCOUNTER
Patient asking Dr. Arredondo to fill her Letrozole 2.5 mg prescription.    (She has 4 left)    Please send it to [facility] on [address].

## 2022-11-11 NOTE — TELEPHONE ENCOUNTER
Called patient and notified her that we have sent a prescription for letrozole (Femara) 2.5mg to [facility] on [address].  Patient was grateful for the followup and denied any questions/concerns.

## 2022-11-16 NOTE — PROGRESS NOTES
On November 16, 2022, Oncology Social Worker Brea Prado contacted pt. via after receiving voicemail message from pt. requesting call back.  Pt. shared she had completed applications and did not know where to send them.  DWIGHT Prado provided pt. with address.      Pt. asked if she would be assigned a nurse navigator.  DWIGHT Prado informed pt. she had placed referral for Nurse Navigator and would place another one and notify them.      Pt. agreed to mail back applications to DWIGHT Prado.

## 2022-11-17 NOTE — PROGRESS NOTES
ONCOLOGY NURSE NAVIGATION ASSESSMENT:  Oncology Nurse Navigator (ONN) Cherry Lopez reached out to patient to follow up on new referral.  ONN introduced myself, my role and the resources at [facility]. ONN explained I am here to provide support, education and guidance throughout her healthcare journey and to assist with any barriers to care.    Patient shared her understanding of her diagnosis and plan of care.  ONN reviewed the physician notes and scheduled appointments.  Patient verbalized understanding of information provided.   Patient's support team is her daughter & family.  Patient's current questions have been answered & she agrees to call with future questions or concerns.   ONN introduction letter & Cancer Support Services Calendar sent to patient via [employer] & another copy will be sent via [employer] per patient's request.    BARRIERS ASSESSMENT:  TRANSPORTATION:  Patient does not drive, but lives with her daughter in [place] and she assists patient to appointments.  EMPLOYMENT:   Retired, no barriers.  FINANCIAL:  Working with OSW to determine assistance eligibility.  INSURANCE:  Medicare A & B.  SUPPORT SYSTEM:  Daughter & her family.  PSYCHOLOGICAL:   Stressful move-in with daughter from out-of-state.  Patient remains independent and positive.  COMMUNICATION:  No barriers noted.   FAMILY CARE:   Patient has a dog & cat.  SELF CARE:  Independent, but does not drive.       INTERVENTION:  ONN introduction letter & [facility] Cancer Support Services Calendar sent to patient via [employer].

## 2022-12-01 NOTE — PROGRESS NOTES
On December 1st, 2022, Oncology Social Worker Brea Prado processed pt.'s [facility] [employer] Transportation Trevor application.  Pt. will receive $100 in gas card to assist with transportation.   Gas card was mailed to pt.

## 2022-12-02 PROBLEM — J40 BRONCHITIS: Status: ACTIVE | Noted: 2022-01-01

## 2022-12-02 NOTE — PROGRESS NOTES
Follow-up medical oncology: 12/2/2022      Referring Physician: Taniya Heck MD  Primary Care:  No primary care provider on file.    Diagnosis: Metastatic ER positive breast cancer    Chief Complaint: Metastatic ER positive breast cancer    History of Presenting Illness:  Rodrick Howard is a 72 y.o. female with a history of metastatic ER positive breast cancer who recently moved to [place] to live with her daughter and establish care here.  Her history dates back to 2018 when she had nipple inversion but did not seek medical care until October 2019 when she was found to have a large left breast mass.  Biopsy showed invasive ductal carcinoma lobular features ER/TX positive HER2 negative.  She had a left mastectomy which showed an 8 cm lesion with 13 of 13 lymph nodes positive.  CT scan post surgery showed diffuse bony disease and the patient declined biopsy at that time.  She was started on letrozole which she was maintained on until January 2021 when she was found to have progressive disease with increasing tumor markers.  She was then started on Faslodex and tolerated this very poorly and this was rapidly discontinued.  She went on tamoxifen for 1 year from February 2021 to [place] 2022 and had further progression in the bone and rising CA 27-29.  She restarted letrozole and Ibrance in February 2022 and has been doing reasonably well subsequently on 100 mg of Ibrance 2 out of 4 weeks.  In preparation for the moved to [place] she had recent reimaging including a bone scan on 9/7/2022 which showed hayden stable osseous metastases in the ribs sternum calvarium femurs and proximal humeri and thoracic spine.  Had a CT scan of the chest abdomen pelvis that time which showed a 3 mm nodule in the posterior right upper lobe and right axillary adenopathy which is smaller.  Diffuse sclerotic bony metastases have not changed.  Her 2729 has been informative and had been rising but apparently the last 1 taken in early  September showed a drop of 400 points from a high of around 2000.  She has a history of a left axillary vein clot and is on Eliquis for this. she also has left arm lymphedema which has not been evaluated lately.  She does not take any narcotic pain medication and tries to use as little medication as possible in general.  She has severe anxiety and distress over the move and takes an occasional Valium for this.  A [employer] comprehensive profile for genomics was done earlier this year and showed ER positivity and a PI 3 kinase mutation potentially actionable by [employer].    Interval history 10/26/2022: She had some difficulty getting her Ibrance to her office but this was finally solved and she is back on schedule with 3-week on 1 week off.  In the past she has occasionally had to delay an extra week for fatigue and nausea but so far she is doing well with this cycle.  Lab tests with CBC are stable with her CA 27-29 2300 essentially stable.  We repeated the ultrasound on the left arm which showed patent veins so Eliquis was discontinued.  She went to lymphedema treatment and is now using a sleeve and compression massages to help her lymphedema.    Interval history 12/2/2022: She has been tolerating her Ibrance and letrozole okay except for increasing fatigue mainly in the afternoon.  She remains independent of ADLs.  She has had for the past 2 weeks bronchial infection and a chronic cough with light gray sputum.  She has not had any fever, shortness of breath, sweats or hemoptysis.  He has some nasal congestion as well.  Lymphedema is stable.  Tumor markers are up about 5% within the range of variation.      History reviewed. No pertinent past medical history.    History reviewed. No pertinent surgical history.          History reviewed. No pertinent family history.    Allergies as of 12/02/2022 - Reviewed 12/02/2022   Allergen Reaction Noted    Codeine Vomiting and Nausea 09/29/2022    Hydrocodone Vomiting and Nausea  09/29/2022    Morphine Unspecified 09/29/2022    Oxycodone Vomiting and Nausea 09/29/2022         Current Outpatient Medications:     letrozole (FEMARA) 2.5 MG Tab, Take 1 Tablet by mouth every day., Disp: 30 Tablet, Rfl: 3    Ginger 500 MG Cap, by Other route., Disp: , Rfl:     acetaminophen (TYLENOL) 500 MG Tab, Take 600 mg by mouth., Disp: , Rfl:     apixaban (ELIQUIS) 2.5mg Tab, Take 1 Tablet by mouth every 12 hours. (Patient not taking: Reported on 10/26/2022), Disp: , Rfl:     Black Elderberry,Berry-Flower, 575 MG Cap, Take  by mouth., Disp: , Rfl:     diazePAM (VALIUM) 5 MG Tab, Take 5 mg by mouth., Disp: , Rfl:     gabapentin (NEURONTIN) 300 MG Cap, Take 300 mg by mouth every evening., Disp: , Rfl:     Multiple Vitamins-Minerals (THERA-M) Tab, Take 1 Tablet by mouth every day., Disp: , Rfl:     Palbociclib 100 MG Tab, Take 100 mg by mouth every day., Disp: , Rfl:     diazePAM (VALIUM) 5 MG Tab, Take 5 mg by mouth every 6 hours as needed for Anxiety., Disp: , Rfl:     acetaminophen (TYLENOL) 500 MG Tab, Take 500-1,000 mg by mouth every 6 hours as needed., Disp: , Rfl:     lidocaine (XYLOCAINE) 4 % Solution, Apply  topically as needed., Disp: , Rfl:     therapeutic multivitamin-minerals (THERAGRAN-M) Tab, Take 1 Tablet by mouth every day., Disp: , Rfl:     Elderberry 500 MG Cap, Take  by mouth., Disp: , Rfl:     ondansetron (ZOFRAN ODT) 8 MG TABLET DISPERSIBLE, Take 8 mg by mouth every 8 hours as needed for Nausea., Disp: , Rfl:     prochlorperazine (COMPAZINE) 10 MG Tab, Take 10 mg by mouth every 6 hours as needed., Disp: , Rfl:     Ginger 500 MG Cap, Take  by mouth., Disp: , Rfl:     gabapentin (NEURONTIN) 300 MG Cap, Take 300 mg by mouth 3 times a day., Disp: , Rfl:     apixaban (ELIQUIS) 2.5mg Tab, Take 2.5 mg by mouth 2 times a day. (Patient not taking: Reported on 10/26/2022), Disp: , Rfl:     Palbociclib 100 MG Cap, Take  by mouth., Disp: , Rfl:     Review of Systems:  Review of Systems  "  Constitutional:  Positive for malaise/fatigue.   HENT: Negative.     Eyes: Negative.    Respiratory:  Positive for cough.    Cardiovascular: Negative.    Gastrointestinal:  Positive for nausea.   Genitourinary: Negative.    Musculoskeletal:  Positive for back pain and neck pain.   Skin: Negative.    Neurological: Negative.    Endo/Heme/Allergies: Negative.    Psychiatric/Behavioral: Negative.          Physical Exam:  Vitals:    12/02/22 1313   Resp: 18   Height: 1.549 m (5' 0.98\")       DESC; KARNOFSKY SCALE WITH ECOG EQUIVALENT: 70, Cares for self; unable to carry on normal activity or to do active work (ECOG equivalent 1)    DISTRESS LEVEL: severe distress    Physical Exam  Constitutional:       Appearance: Normal appearance.   HENT:      Head: Normocephalic.      Mouth/Throat:      Mouth: Mucous membranes are moist.      Pharynx: Oropharynx is clear.   Eyes:      Extraocular Movements: Extraocular movements intact.      Conjunctiva/sclera: Conjunctivae normal.      Pupils: Pupils are equal, round, and reactive to light.   Cardiovascular:      Rate and Rhythm: Normal rate and regular rhythm.      Pulses: Normal pulses.   Pulmonary:      Effort: Pulmonary effort is normal.      Breath sounds: Normal breath sounds.      Comments: Scattered rhonchi at the left base otherwise clear to AMP  Abdominal:      General: Abdomen is flat. Bowel sounds are normal.      Palpations: Abdomen is soft. There is no mass.   Musculoskeletal:         General: Normal range of motion.      Left upper arm: Edema present.      Comments: 2-3+ lymphedema of the entire left arm   Skin:     General: Skin is warm.   Neurological:      General: No focal deficit present.      Mental Status: She is alert and oriented to person, place, and time.   Psychiatric:         Mood and Affect: Mood normal.         Behavior: Behavior normal.            Labs:  No visits with results within 1 Month(s) from this visit.   Latest known visit with results is: "   No results found for any previous visit.       Imaging:   All listed images below have been independently reviewed by me. I agree with the findings as summarized below:    CT ABDOMEN-PELVIS WITH IV CONTRAST    Result Date: 9/9/2022  CT CHEST ABDOMEN PELVIS W IV CONTRAST  9/9/2022 1:38 PM PROVIDED CLINICAL INDICATIONS:  metastatic breast cancer, restaging Malignant neoplasm of upper-outer quadrant of left female breast (HCC); Estrogen receptor positive status (ER+) ADDITIONAL CLINICAL HISTORY:  None. COMPARISON: February 3, 2022 and July 20, 2021. TECHNIQUE: Contiguous axial imaging of the chest, abdomen, and pelvis was performed with spiral technique after the administration of intravenous contrast. The images were reformatted in sagittal and coronal plane.  Dose reduction techniques were used including but not limited to automated exposure control (AEC), iterative reconstruction technique, and/or mA and/or KV dose adjustments based on patient's size.  FINDINGS: CHEST: Heart: Normal size. No pericardial effusion. Aorta: Nonaneurysmal. Anatomic origination of the great vessels from the aortic arch. Mediastinum and leslie: No mediastinal or hilar adenopathy. Trachea and esophagus are normal. Thyroid is enlarged and shows heterogeneous attenuation with scattered nodules, unchanged from July 2021. Lungs: 3 mm nodule in the posterior right upper lobe is new from the previous study. No additional lung nodules. No infiltrate. Pleura: Small right pleural effusion is new from the previous exam. ABDOMEN: Liver: Low-attenuation is present throughout. No liver lesions. Portal vein is patent. Gallbladder and common bile duct: No calcified gallstones. Common bile duct has a normal caliber. Pancreas: Homogeneous enhancement. No peripancreatic fat stranding or fluid collections. Spleen: Normal size and homogeneous enhancement. Adrenal glands: Normal. Kidneys: Symmetric with homogeneous enhancement. No hydronephrosis. Aorta and IVC:  Normal caliber. Stomach and bowel: Stomach is unremarkable. Small and large bowel are nondilated. No pericolic fat stranding. PELVIS: Retroperitoneum and mesentery: No mass or adenopathy. Peritoneum: No free intraperitoneal fluid or pneumoperitoneum. No mass. Genitourinary: Urinary bladder has a smooth contour. No bladder calculus. Uterus is unremarkable. Body wall: Previous left mastectomy and left axillary lymph node dissection. Prominent lymph nodes in the right axilla have increased in size significantly measuring up to 10 mm in short axis. Bones: Diffuse sclerotic bony metastases have not changed significantly.    IMPRESSION:  Widespread sclerotic bony metastatic disease has not changed significantly. Noncalcified nodule in the right upper lobe and mildly enlarged right axillary lymph nodes are new from prior. Previous left mastectomy and left axillary lymph node dissection. Small right pleural effusion. Hepatic steatosis.    NM bone scan whole body    Result Date: 9/7/2022  NM BONE SCAN WHOLE BODY 9/7/2022 1:41 PM PROVIDED CLINICAL INDICATIONS:  metastatic breast cancer restaging Malignant neoplasm of upper-outer quadrant of left female breast (HCC); Estrogen receptor positive status (ER+) ADDITIONAL CLINICAL HISTORY:  None. COMPARISON:  Nuclear medicine bone scan dated 2/7/2022. TECHNIQUE: Whole body anterior and posterior planar images are obtained at 3 hours following intravenous administration of 25.2 mCi Tc-99m labeled MDP. FINDINGS:  Stable radiotracer uptake within the bilateral posterior ribs, sternum, calvarium, femurs and proximal humeri. Stable radiotracer uptake within the lower thoracic spine.    IMPRESSION:  Stable osseous metastases.       Pathology:      Assessment & Plan:  1.  Metastatic ER positive breast cancer with extensive bone metastases, currently on letrozole and Ibrance with imaging stable disease and some fluctuation in tumor markers.  At this time my inclination is to continue her  on this therapy.  She does not have new symptoms associated with progression.  2.  Left arm lymphedema.  stAble  3.  History of left venous thrombosis in the axillary area.  Negative ultrasound now off anticoagulation  4.  Situational anxiety.  She will use Valium at her discretion  5.  Bronchitis    plan continue letrozole and Ibrance.  I am renewing her gabapentin.  She is starting on Levaquin the drug that she insists works best as an antibiotic for her for 7 days for her bronchitis.  I will see her back in about 6 5 to 6 weeks.  She will need imaging in the near future and a progression she may be a candidate for one of the new clinical trials.    Any questions and concerns raised by the patient were answered to the best of my ability. Thank you for allowing me to participate in the care for this patient. Please feel free to contact me for any questions or concerns.     Nathan Arredondo M.D.

## 2022-12-29 NOTE — PROGRESS NOTES
Standing lab orders added- available as often as every 2 weeks but will generally be completed monthly

## 2023-01-01 ENCOUNTER — APPOINTMENT (OUTPATIENT)
Dept: CARDIOLOGY | Facility: MEDICAL CENTER | Age: 73
DRG: 189 | End: 2023-01-01
Attending: INTERNAL MEDICINE
Payer: MEDICARE

## 2023-01-01 ENCOUNTER — OFFICE VISIT (OUTPATIENT)
Dept: SLEEP MEDICINE | Facility: MEDICAL CENTER | Age: 73
End: 2023-01-01
Attending: INTERNAL MEDICINE
Payer: MEDICARE

## 2023-01-01 ENCOUNTER — TELEPHONE (OUTPATIENT)
Dept: HEMATOLOGY ONCOLOGY | Facility: MEDICAL CENTER | Age: 73
End: 2023-01-01
Payer: MEDICARE

## 2023-01-01 ENCOUNTER — APPOINTMENT (OUTPATIENT)
Dept: RADIOLOGY | Facility: IMAGING CENTER | Age: 73
End: 2023-01-01
Attending: INTERNAL MEDICINE
Payer: MEDICARE

## 2023-01-01 ENCOUNTER — APPOINTMENT (OUTPATIENT)
Dept: RADIOLOGY | Facility: MEDICAL CENTER | Age: 73
DRG: 189 | End: 2023-01-01
Attending: EMERGENCY MEDICINE
Payer: MEDICARE

## 2023-01-01 ENCOUNTER — HOSPITAL ENCOUNTER (OUTPATIENT)
Dept: RADIOLOGY | Facility: MEDICAL CENTER | Age: 73
End: 2023-01-10
Attending: INTERNAL MEDICINE
Payer: MEDICARE

## 2023-01-01 ENCOUNTER — HOSPITAL ENCOUNTER (OUTPATIENT)
Dept: HEMATOLOGY ONCOLOGY | Facility: MEDICAL CENTER | Age: 73
End: 2023-01-17
Attending: INTERNAL MEDICINE
Payer: MEDICARE

## 2023-01-01 ENCOUNTER — HOSPITAL ENCOUNTER (EMERGENCY)
Facility: MEDICAL CENTER | Age: 73
End: 2023-01-07
Payer: MEDICARE

## 2023-01-01 ENCOUNTER — HOSPITAL ENCOUNTER (OUTPATIENT)
Dept: HEMATOLOGY ONCOLOGY | Facility: MEDICAL CENTER | Age: 73
End: 2023-03-08
Attending: NURSE PRACTITIONER
Payer: MEDICARE

## 2023-01-01 ENCOUNTER — HOSPITAL ENCOUNTER (OUTPATIENT)
Dept: LAB | Facility: MEDICAL CENTER | Age: 73
End: 2023-02-28
Attending: NURSE PRACTITIONER
Payer: MEDICARE

## 2023-01-01 ENCOUNTER — HOSPITAL ENCOUNTER (OUTPATIENT)
Dept: RADIOLOGY | Facility: MEDICAL CENTER | Age: 73
End: 2023-01-07
Attending: INTERNAL MEDICINE
Payer: MEDICARE

## 2023-01-01 ENCOUNTER — HOME CARE VISIT (OUTPATIENT)
Dept: HOSPICE | Facility: HOSPICE | Age: 73
End: 2023-01-01
Payer: MEDICARE

## 2023-01-01 ENCOUNTER — TELEPHONE (OUTPATIENT)
Dept: PHYSICAL THERAPY | Facility: REHABILITATION | Age: 73
End: 2023-01-01
Payer: MEDICARE

## 2023-01-01 ENCOUNTER — HOSPITAL ENCOUNTER (OUTPATIENT)
Dept: HEMATOLOGY ONCOLOGY | Facility: MEDICAL CENTER | Age: 73
End: 2023-03-28
Attending: INTERNAL MEDICINE
Payer: MEDICARE

## 2023-01-01 ENCOUNTER — HOSPITAL ENCOUNTER (OUTPATIENT)
Dept: RADIOLOGY | Facility: MEDICAL CENTER | Age: 73
End: 2023-05-12
Attending: RADIOLOGY
Payer: MEDICARE

## 2023-01-01 ENCOUNTER — HOSPITAL ENCOUNTER (OUTPATIENT)
Dept: HEMATOLOGY ONCOLOGY | Facility: MEDICAL CENTER | Age: 73
End: 2023-03-02
Attending: INTERNAL MEDICINE
Payer: MEDICARE

## 2023-01-01 ENCOUNTER — TELEPHONE (OUTPATIENT)
Dept: SLEEP MEDICINE | Facility: MEDICAL CENTER | Age: 73
End: 2023-01-01
Payer: MEDICARE

## 2023-01-01 ENCOUNTER — OFFICE VISIT (OUTPATIENT)
Dept: SLEEP MEDICINE | Facility: MEDICAL CENTER | Age: 73
End: 2023-01-01
Attending: EMERGENCY MEDICINE
Payer: MEDICARE

## 2023-01-01 ENCOUNTER — TELEPHONE (OUTPATIENT)
Dept: ONCOLOGY | Facility: MEDICAL CENTER | Age: 73
End: 2023-01-01
Payer: MEDICARE

## 2023-01-01 ENCOUNTER — HOSPITAL ENCOUNTER (OUTPATIENT)
Dept: HEMATOLOGY ONCOLOGY | Facility: MEDICAL CENTER | Age: 73
End: 2023-01-04
Attending: INTERNAL MEDICINE
Payer: MEDICARE

## 2023-01-01 ENCOUNTER — OUTPATIENT INFUSION SERVICES (OUTPATIENT)
Dept: ONCOLOGY | Facility: MEDICAL CENTER | Age: 73
End: 2023-01-01
Attending: INTERNAL MEDICINE
Payer: MEDICARE

## 2023-01-01 ENCOUNTER — HOSPITAL ENCOUNTER (OUTPATIENT)
Dept: LAB | Facility: MEDICAL CENTER | Age: 73
End: 2023-03-22
Attending: NURSE PRACTITIONER
Payer: MEDICARE

## 2023-01-01 ENCOUNTER — PHARMACY VISIT (OUTPATIENT)
Dept: PHARMACY | Facility: MEDICAL CENTER | Age: 73
End: 2023-01-01
Payer: COMMERCIAL

## 2023-01-01 ENCOUNTER — HOSPITAL ENCOUNTER (OUTPATIENT)
Dept: RADIOLOGY | Facility: MEDICAL CENTER | Age: 73
End: 2023-05-12
Attending: INTERNAL MEDICINE
Payer: MEDICARE

## 2023-01-01 ENCOUNTER — HOSPITAL ENCOUNTER (OUTPATIENT)
Dept: RADIOLOGY | Facility: MEDICAL CENTER | Age: 73
End: 2023-04-07
Attending: INTERNAL MEDICINE
Payer: MEDICARE

## 2023-01-01 ENCOUNTER — HOSPICE ADMISSION (OUTPATIENT)
Dept: HOSPICE | Facility: HOSPICE | Age: 73
End: 2023-01-01
Payer: MEDICARE

## 2023-01-01 ENCOUNTER — OFFICE VISIT (OUTPATIENT)
Dept: URGENT CARE | Facility: CLINIC | Age: 73
End: 2023-01-01
Payer: MEDICARE

## 2023-01-01 ENCOUNTER — HOSPITAL ENCOUNTER (OUTPATIENT)
Facility: MEDICAL CENTER | Age: 73
End: 2023-03-08
Attending: NURSE PRACTITIONER
Payer: MEDICARE

## 2023-01-01 ENCOUNTER — APPOINTMENT (OUTPATIENT)
Dept: HEMATOLOGY ONCOLOGY | Facility: MEDICAL CENTER | Age: 73
End: 2023-01-01
Payer: MEDICARE

## 2023-01-01 ENCOUNTER — HOSPITAL ENCOUNTER (OUTPATIENT)
Dept: HEMATOLOGY ONCOLOGY | Facility: MEDICAL CENTER | Age: 73
End: 2023-03-20
Attending: NURSE PRACTITIONER
Payer: MEDICARE

## 2023-01-01 ENCOUNTER — HOSPITAL ENCOUNTER (OUTPATIENT)
Dept: LAB | Facility: MEDICAL CENTER | Age: 73
End: 2023-05-02
Attending: NURSE PRACTITIONER
Payer: MEDICARE

## 2023-01-01 ENCOUNTER — HOSPITAL ENCOUNTER (OUTPATIENT)
Dept: HEMATOLOGY ONCOLOGY | Facility: MEDICAL CENTER | Age: 73
End: 2023-05-03
Attending: INTERNAL MEDICINE
Payer: MEDICARE

## 2023-01-01 ENCOUNTER — HOSPITAL ENCOUNTER (INPATIENT)
Facility: MEDICAL CENTER | Age: 73
LOS: 1 days | DRG: 189 | End: 2023-03-30
Attending: EMERGENCY MEDICINE | Admitting: INTERNAL MEDICINE
Payer: MEDICARE

## 2023-01-01 VITALS
SYSTOLIC BLOOD PRESSURE: 151 MMHG | RESPIRATION RATE: 19 BRPM | DIASTOLIC BLOOD PRESSURE: 98 MMHG | OXYGEN SATURATION: 93 % | WEIGHT: 226.63 LBS | BODY MASS INDEX: 45.69 KG/M2 | HEART RATE: 91 BPM | TEMPERATURE: 98.2 F | HEIGHT: 59 IN

## 2023-01-01 VITALS
BODY MASS INDEX: 42.98 KG/M2 | TEMPERATURE: 97.8 F | HEIGHT: 61 IN | RESPIRATION RATE: 19 BRPM | OXYGEN SATURATION: 95 % | DIASTOLIC BLOOD PRESSURE: 81 MMHG | WEIGHT: 227.63 LBS | HEART RATE: 66 BPM | SYSTOLIC BLOOD PRESSURE: 142 MMHG

## 2023-01-01 VITALS
HEIGHT: 60 IN | HEART RATE: 78 BPM | SYSTOLIC BLOOD PRESSURE: 126 MMHG | DIASTOLIC BLOOD PRESSURE: 86 MMHG | RESPIRATION RATE: 16 BRPM | BODY MASS INDEX: 44.15 KG/M2 | WEIGHT: 224.87 LBS | TEMPERATURE: 98.2 F | OXYGEN SATURATION: 91 %

## 2023-01-01 VITALS
RESPIRATION RATE: 16 BRPM | OXYGEN SATURATION: 92 % | BODY MASS INDEX: 43.05 KG/M2 | WEIGHT: 228 LBS | HEIGHT: 61 IN | HEART RATE: 81 BPM | DIASTOLIC BLOOD PRESSURE: 70 MMHG | SYSTOLIC BLOOD PRESSURE: 130 MMHG

## 2023-01-01 VITALS
WEIGHT: 229.83 LBS | HEART RATE: 95 BPM | RESPIRATION RATE: 20 BRPM | SYSTOLIC BLOOD PRESSURE: 134 MMHG | TEMPERATURE: 97.5 F | OXYGEN SATURATION: 96 % | BODY MASS INDEX: 43.43 KG/M2 | DIASTOLIC BLOOD PRESSURE: 72 MMHG

## 2023-01-01 VITALS
BODY MASS INDEX: 43.23 KG/M2 | TEMPERATURE: 97.5 F | HEART RATE: 69 BPM | DIASTOLIC BLOOD PRESSURE: 74 MMHG | SYSTOLIC BLOOD PRESSURE: 141 MMHG | RESPIRATION RATE: 16 BRPM | OXYGEN SATURATION: 97 % | WEIGHT: 228.95 LBS | HEIGHT: 61 IN

## 2023-01-01 VITALS
OXYGEN SATURATION: 94 % | TEMPERATURE: 97.2 F | BODY MASS INDEX: 45.22 KG/M2 | DIASTOLIC BLOOD PRESSURE: 87 MMHG | RESPIRATION RATE: 19 BRPM | SYSTOLIC BLOOD PRESSURE: 149 MMHG | HEART RATE: 81 BPM | WEIGHT: 239.53 LBS | HEIGHT: 61 IN

## 2023-01-01 VITALS
WEIGHT: 224.43 LBS | TEMPERATURE: 97.1 F | BODY MASS INDEX: 44.06 KG/M2 | SYSTOLIC BLOOD PRESSURE: 140 MMHG | HEART RATE: 68 BPM | OXYGEN SATURATION: 93 % | RESPIRATION RATE: 18 BRPM | DIASTOLIC BLOOD PRESSURE: 77 MMHG | HEIGHT: 60 IN

## 2023-01-01 VITALS
TEMPERATURE: 97.9 F | BODY MASS INDEX: 43.43 KG/M2 | SYSTOLIC BLOOD PRESSURE: 102 MMHG | DIASTOLIC BLOOD PRESSURE: 64 MMHG | OXYGEN SATURATION: 98 % | RESPIRATION RATE: 18 BRPM | HEART RATE: 94 BPM | HEIGHT: 61 IN | WEIGHT: 230 LBS

## 2023-01-01 VITALS
BODY MASS INDEX: 43.43 KG/M2 | HEIGHT: 61 IN | SYSTOLIC BLOOD PRESSURE: 122 MMHG | DIASTOLIC BLOOD PRESSURE: 82 MMHG | HEART RATE: 83 BPM | OXYGEN SATURATION: 93 % | WEIGHT: 230 LBS

## 2023-01-01 VITALS
RESPIRATION RATE: 18 BRPM | DIASTOLIC BLOOD PRESSURE: 65 MMHG | BODY MASS INDEX: 45.41 KG/M2 | TEMPERATURE: 97.1 F | SYSTOLIC BLOOD PRESSURE: 126 MMHG | OXYGEN SATURATION: 96 % | WEIGHT: 240.52 LBS | HEIGHT: 61 IN | HEART RATE: 71 BPM

## 2023-01-01 VITALS — RESPIRATION RATE: 18 BRPM | WEIGHT: 228.18 LBS | HEIGHT: 61 IN | BODY MASS INDEX: 43.08 KG/M2

## 2023-01-01 VITALS
BODY MASS INDEX: 43.99 KG/M2 | SYSTOLIC BLOOD PRESSURE: 114 MMHG | WEIGHT: 233 LBS | HEIGHT: 61 IN | HEART RATE: 94 BPM | DIASTOLIC BLOOD PRESSURE: 62 MMHG | OXYGEN SATURATION: 92 %

## 2023-01-01 VITALS
WEIGHT: 223 LBS | DIASTOLIC BLOOD PRESSURE: 74 MMHG | HEART RATE: 93 BPM | HEIGHT: 60 IN | BODY MASS INDEX: 43.78 KG/M2 | SYSTOLIC BLOOD PRESSURE: 114 MMHG | OXYGEN SATURATION: 91 %

## 2023-01-01 VITALS
BODY MASS INDEX: 43.43 KG/M2 | RESPIRATION RATE: 20 BRPM | TEMPERATURE: 98 F | HEIGHT: 61 IN | DIASTOLIC BLOOD PRESSURE: 70 MMHG | HEART RATE: 85 BPM | OXYGEN SATURATION: 93 % | SYSTOLIC BLOOD PRESSURE: 126 MMHG | WEIGHT: 230.05 LBS

## 2023-01-01 DIAGNOSIS — R05.3 CHRONIC COUGH: ICD-10-CM

## 2023-01-01 DIAGNOSIS — Z79.899 HIGH RISK MEDICATION USE: ICD-10-CM

## 2023-01-01 DIAGNOSIS — C50.919 MALIGNANT NEOPLASM OF BREAST METASTATIC TO BONE (HCC): ICD-10-CM

## 2023-01-01 DIAGNOSIS — J18.9 LUNG INFECTION: ICD-10-CM

## 2023-01-01 DIAGNOSIS — J40 BRONCHITIS: ICD-10-CM

## 2023-01-01 DIAGNOSIS — C79.51 MALIGNANT NEOPLASM OF BREAST METASTATIC TO BONE (HCC): ICD-10-CM

## 2023-01-01 DIAGNOSIS — Z79.899 ENCOUNTER FOR LONG-TERM (CURRENT) USE OF HIGH-RISK MEDICATION: ICD-10-CM

## 2023-01-01 DIAGNOSIS — R91.8 ABNORMAL CT SCAN, LUNG: ICD-10-CM

## 2023-01-01 DIAGNOSIS — Z79.899 ENCOUNTER FOR LONG-TERM CURRENT USE OF HIGH RISK MEDICATION: ICD-10-CM

## 2023-01-01 DIAGNOSIS — J96.11 CHRONIC RESPIRATORY FAILURE WITH HYPOXIA (HCC): ICD-10-CM

## 2023-01-01 DIAGNOSIS — J90 PLEURAL EFFUSION ON RIGHT: ICD-10-CM

## 2023-01-01 DIAGNOSIS — J22 ACUTE LOWER RESPIRATORY TRACT INFECTION: ICD-10-CM

## 2023-01-01 DIAGNOSIS — J91.0 MALIGNANT PLEURAL EFFUSION: ICD-10-CM

## 2023-01-01 DIAGNOSIS — F41.9 ANXIETY: ICD-10-CM

## 2023-01-01 DIAGNOSIS — K12.30 STOMATITIS AND MUCOSITIS: ICD-10-CM

## 2023-01-01 DIAGNOSIS — J96.01 ACUTE RESPIRATORY FAILURE WITH HYPOXIA (HCC): ICD-10-CM

## 2023-01-01 DIAGNOSIS — D64.89 ANEMIA DUE TO MEDICATION: ICD-10-CM

## 2023-01-01 DIAGNOSIS — K12.1 STOMATITIS AND MUCOSITIS: ICD-10-CM

## 2023-01-01 DIAGNOSIS — Z98.890 S/P THORACENTESIS: ICD-10-CM

## 2023-01-01 DIAGNOSIS — R53.0 NEOPLASTIC MALIGNANT RELATED FATIGUE: ICD-10-CM

## 2023-01-01 DIAGNOSIS — C50.919 MALIGNANT NEOPLASM OF BREAST METASTATIC TO BONE (HCC): Primary | ICD-10-CM

## 2023-01-01 DIAGNOSIS — C79.51 MALIGNANT NEOPLASM OF BREAST METASTATIC TO BONE (HCC): Primary | ICD-10-CM

## 2023-01-01 DIAGNOSIS — R11.2 NAUSEA AND VOMITING, UNSPECIFIED VOMITING TYPE: ICD-10-CM

## 2023-01-01 DIAGNOSIS — R06.09 DYSPNEA ON EXERTION: ICD-10-CM

## 2023-01-01 LAB
ABO + RH BLD: NORMAL
ABO GROUP BLD: NORMAL
ABO GROUP BLD: NORMAL
ALBUMIN SERPL BCP-MCNC: 3.4 G/DL (ref 3.2–4.9)
ALBUMIN SERPL BCP-MCNC: 3.4 G/DL (ref 3.2–4.9)
ALBUMIN SERPL BCP-MCNC: 3.5 G/DL (ref 3.2–4.9)
ALBUMIN SERPL BCP-MCNC: 3.9 G/DL (ref 3.2–4.9)
ALBUMIN/GLOB SERPL: 1.3 G/DL
ALBUMIN/GLOB SERPL: 1.4 G/DL
ALP SERPL-CCNC: 111 U/L (ref 30–99)
ALP SERPL-CCNC: 95 U/L (ref 30–99)
ALP SERPL-CCNC: 97 U/L (ref 30–99)
ALP SERPL-CCNC: 97 U/L (ref 30–99)
ALT SERPL-CCNC: 27 U/L (ref 2–50)
ALT SERPL-CCNC: 31 U/L (ref 2–50)
ALT SERPL-CCNC: 37 U/L (ref 2–50)
ALT SERPL-CCNC: 53 U/L (ref 2–50)
ANION GAP SERPL CALC-SCNC: 12 MMOL/L (ref 7–16)
ANION GAP SERPL CALC-SCNC: 13 MMOL/L (ref 7–16)
ANION GAP SERPL CALC-SCNC: 15 MMOL/L (ref 7–16)
ANION GAP SERPL CALC-SCNC: 15 MMOL/L (ref 7–16)
ANION GAP SERPL CALC-SCNC: 19 MMOL/L (ref 7–16)
ANISOCYTOSIS BLD QL SMEAR: ABNORMAL
AST SERPL-CCNC: 29 U/L (ref 12–45)
AST SERPL-CCNC: 38 U/L (ref 12–45)
AST SERPL-CCNC: 41 U/L (ref 12–45)
AST SERPL-CCNC: 54 U/L (ref 12–45)
BARCODED ABORH UBTYP: 6200
BARCODED ABORH UBTYP: 6200
BARCODED PRD CODE UBPRD: NORMAL
BARCODED PRD CODE UBPRD: NORMAL
BARCODED UNIT NUM UBUNT: NORMAL
BARCODED UNIT NUM UBUNT: NORMAL
BASOPHILS # BLD AUTO: 0 % (ref 0–1.8)
BASOPHILS # BLD AUTO: 0 % (ref 0–1.8)
BASOPHILS # BLD AUTO: 0.2 % (ref 0–1.8)
BASOPHILS # BLD AUTO: 0.4 % (ref 0–1.8)
BASOPHILS # BLD AUTO: 0.6 % (ref 0–1.8)
BASOPHILS # BLD AUTO: 0.6 % (ref 0–1.8)
BASOPHILS # BLD AUTO: 0.8 % (ref 0–1.8)
BASOPHILS # BLD: 0 K/UL (ref 0–0.12)
BASOPHILS # BLD: 0 K/UL (ref 0–0.12)
BASOPHILS # BLD: 0.01 K/UL (ref 0–0.12)
BASOPHILS # BLD: 0.02 K/UL (ref 0–0.12)
BASOPHILS # BLD: 0.02 K/UL (ref 0–0.12)
BASOPHILS # BLD: 0.03 K/UL (ref 0–0.12)
BASOPHILS # BLD: 0.04 K/UL (ref 0–0.12)
BILIRUB SERPL-MCNC: 0.6 MG/DL (ref 0.1–1.5)
BILIRUB SERPL-MCNC: 0.6 MG/DL (ref 0.1–1.5)
BILIRUB SERPL-MCNC: 0.8 MG/DL (ref 0.1–1.5)
BILIRUB SERPL-MCNC: 1 MG/DL (ref 0.1–1.5)
BLD GP AB SCN SERPL QL: NORMAL
BLD GP AB SCN SERPL QL: NORMAL
BUN SERPL-MCNC: 15 MG/DL (ref 8–22)
BUN SERPL-MCNC: 16 MG/DL (ref 8–22)
BUN SERPL-MCNC: 16 MG/DL (ref 8–22)
BUN SERPL-MCNC: 19 MG/DL (ref 8–22)
BUN SERPL-MCNC: 27 MG/DL (ref 8–22)
CALCIUM ALBUM COR SERPL-MCNC: 9.1 MG/DL (ref 8.5–10.5)
CALCIUM ALBUM COR SERPL-MCNC: 9.5 MG/DL (ref 8.5–10.5)
CALCIUM ALBUM COR SERPL-MCNC: 9.7 MG/DL (ref 8.5–10.5)
CALCIUM ALBUM COR SERPL-MCNC: 9.7 MG/DL (ref 8.5–10.5)
CALCIUM SERPL-MCNC: 8.8 MG/DL (ref 8.4–10.2)
CALCIUM SERPL-MCNC: 9 MG/DL (ref 8.5–10.5)
CALCIUM SERPL-MCNC: 9.1 MG/DL (ref 8.5–10.5)
CALCIUM SERPL-MCNC: 9.2 MG/DL (ref 8.4–10.2)
CALCIUM SERPL-MCNC: 9.2 MG/DL (ref 8.5–10.5)
CANCER AG27-29 SERPL-ACNC: 1384.5 U/ML
CANCER AG27-29 SERPL-ACNC: 1995.9 U/ML
CANCER AG27-29 SERPL-ACNC: 2643.7 U/ML
CEA SERPL-MCNC: 116 NG/ML (ref 0–3)
CEA SERPL-MCNC: 155 NG/ML (ref 0–3)
CEA SERPL-MCNC: 97.7 NG/ML (ref 0–3)
CHLORIDE SERPL-SCNC: 100 MMOL/L (ref 96–112)
CHLORIDE SERPL-SCNC: 103 MMOL/L (ref 96–112)
CHLORIDE SERPL-SCNC: 95 MMOL/L (ref 96–112)
CHLORIDE SERPL-SCNC: 98 MMOL/L (ref 96–112)
CHLORIDE SERPL-SCNC: 99 MMOL/L (ref 96–112)
CO2 SERPL-SCNC: 21 MMOL/L (ref 20–33)
CO2 SERPL-SCNC: 23 MMOL/L (ref 20–33)
CO2 SERPL-SCNC: 23 MMOL/L (ref 20–33)
CO2 SERPL-SCNC: 24 MMOL/L (ref 20–33)
CO2 SERPL-SCNC: 25 MMOL/L (ref 20–33)
COMMENT 1642: NORMAL
COMMENT 1642: NORMAL
COMPONENT R 8504R: NORMAL
COMPONENT R 8504R: NORMAL
CREAT SERPL-MCNC: 0.82 MG/DL (ref 0.5–1.4)
CREAT SERPL-MCNC: 0.83 MG/DL (ref 0.5–1.4)
CREAT SERPL-MCNC: 0.84 MG/DL (ref 0.5–1.4)
CREAT SERPL-MCNC: 0.88 MG/DL (ref 0.5–1.4)
CREAT SERPL-MCNC: 0.92 MG/DL (ref 0.5–1.4)
DACRYOCYTES BLD QL SMEAR: NORMAL
EKG IMPRESSION: NORMAL
EOSINOPHIL # BLD AUTO: 0 K/UL (ref 0–0.51)
EOSINOPHIL # BLD AUTO: 0.01 K/UL (ref 0–0.51)
EOSINOPHIL # BLD AUTO: 0.06 K/UL (ref 0–0.51)
EOSINOPHIL # BLD AUTO: 0.1 K/UL (ref 0–0.51)
EOSINOPHIL # BLD AUTO: 0.1 K/UL (ref 0–0.51)
EOSINOPHIL # BLD AUTO: 0.11 K/UL (ref 0–0.51)
EOSINOPHIL # BLD AUTO: 0.16 K/UL (ref 0–0.51)
EOSINOPHIL NFR BLD: 0 % (ref 0–6.9)
EOSINOPHIL NFR BLD: 0.1 % (ref 0–6.9)
EOSINOPHIL NFR BLD: 1.8 % (ref 0–6.9)
EOSINOPHIL NFR BLD: 1.9 % (ref 0–6.9)
EOSINOPHIL NFR BLD: 1.9 % (ref 0–6.9)
EOSINOPHIL NFR BLD: 2.6 % (ref 0–6.9)
EOSINOPHIL NFR BLD: 3 % (ref 0–6.9)
ERYTHROCYTE [DISTWIDTH] IN BLOOD BY AUTOMATED COUNT: 58.9 FL (ref 35.9–50)
ERYTHROCYTE [DISTWIDTH] IN BLOOD BY AUTOMATED COUNT: 60.8 FL (ref 35.9–50)
ERYTHROCYTE [DISTWIDTH] IN BLOOD BY AUTOMATED COUNT: 65.6 FL (ref 35.9–50)
ERYTHROCYTE [DISTWIDTH] IN BLOOD BY AUTOMATED COUNT: 66.5 FL (ref 35.9–50)
ERYTHROCYTE [DISTWIDTH] IN BLOOD BY AUTOMATED COUNT: 68 FL (ref 35.9–50)
ERYTHROCYTE [DISTWIDTH] IN BLOOD BY AUTOMATED COUNT: 72 FL (ref 35.9–50)
ERYTHROCYTE [DISTWIDTH] IN BLOOD BY AUTOMATED COUNT: 80 FL (ref 35.9–50)
FERRITIN SERPL-MCNC: 794 NG/ML (ref 10–291)
GFR SERPLBLD CREATININE-BSD FMLA CKD-EPI: 66 ML/MIN/1.73 M 2
GFR SERPLBLD CREATININE-BSD FMLA CKD-EPI: 69 ML/MIN/1.73 M 2
GFR SERPLBLD CREATININE-BSD FMLA CKD-EPI: 73 ML/MIN/1.73 M 2
GFR SERPLBLD CREATININE-BSD FMLA CKD-EPI: 75 ML/MIN/1.73 M 2
GFR SERPLBLD CREATININE-BSD FMLA CKD-EPI: 76 ML/MIN/1.73 M 2
GLOBULIN SER CALC-MCNC: 2.5 G/DL (ref 1.9–3.5)
GLOBULIN SER CALC-MCNC: 2.7 G/DL (ref 1.9–3.5)
GLOBULIN SER CALC-MCNC: 2.7 G/DL (ref 1.9–3.5)
GLOBULIN SER CALC-MCNC: 3 G/DL (ref 1.9–3.5)
GLUCOSE SERPL-MCNC: 103 MG/DL (ref 65–99)
GLUCOSE SERPL-MCNC: 104 MG/DL (ref 65–99)
GLUCOSE SERPL-MCNC: 146 MG/DL (ref 65–99)
GLUCOSE SERPL-MCNC: 86 MG/DL (ref 65–99)
GLUCOSE SERPL-MCNC: 95 MG/DL (ref 65–99)
HCT VFR BLD AUTO: 21.8 % (ref 37–47)
HCT VFR BLD AUTO: 23.3 % (ref 37–47)
HCT VFR BLD AUTO: 23.7 % (ref 37–47)
HCT VFR BLD AUTO: 24.7 % (ref 37–47)
HCT VFR BLD AUTO: 25.7 % (ref 37–47)
HCT VFR BLD AUTO: 26.3 % (ref 37–47)
HCT VFR BLD AUTO: 26.9 % (ref 37–47)
HCT VFR BLD AUTO: 27.8 % (ref 37–47)
HCT VFR BLD AUTO: 28.4 % (ref 37–47)
HGB BLD-MCNC: 6.9 G/DL (ref 12–16)
HGB BLD-MCNC: 7.5 G/DL (ref 12–16)
HGB BLD-MCNC: 7.8 G/DL (ref 12–16)
HGB BLD-MCNC: 8.1 G/DL (ref 12–16)
HGB BLD-MCNC: 8.3 G/DL (ref 12–16)
HGB BLD-MCNC: 8.3 G/DL (ref 12–16)
HGB BLD-MCNC: 8.5 G/DL (ref 12–16)
HGB BLD-MCNC: 8.9 G/DL (ref 12–16)
HGB BLD-MCNC: 9 G/DL (ref 12–16)
IMM GRANULOCYTES # BLD AUTO: 0.08 K/UL (ref 0–0.11)
IMM GRANULOCYTES # BLD AUTO: 0.11 K/UL (ref 0–0.11)
IMM GRANULOCYTES # BLD AUTO: 0.26 K/UL (ref 0–0.11)
IMM GRANULOCYTES # BLD AUTO: 0.29 K/UL (ref 0–0.11)
IMM GRANULOCYTES # BLD AUTO: 0.41 K/UL (ref 0–0.11)
IMM GRANULOCYTES NFR BLD AUTO: 2.4 % (ref 0–0.9)
IMM GRANULOCYTES NFR BLD AUTO: 2.9 % (ref 0–0.9)
IMM GRANULOCYTES NFR BLD AUTO: 4.9 % (ref 0–0.9)
IMM GRANULOCYTES NFR BLD AUTO: 5 % (ref 0–0.9)
IMM GRANULOCYTES NFR BLD AUTO: 5.8 % (ref 0–0.9)
IRON SATN MFR SERPL: 46 % (ref 15–55)
IRON SERPL-MCNC: 122 UG/DL (ref 40–170)
LG PLATELETS BLD QL SMEAR: NORMAL
LV EJECT FRACT  99904: 65
LYMPHOCYTES # BLD AUTO: 0.56 K/UL (ref 1–4.8)
LYMPHOCYTES # BLD AUTO: 0.83 K/UL (ref 1–4.8)
LYMPHOCYTES # BLD AUTO: 0.98 K/UL (ref 1–4.8)
LYMPHOCYTES # BLD AUTO: 1.04 K/UL (ref 1–4.8)
LYMPHOCYTES # BLD AUTO: 1.13 K/UL (ref 1–4.8)
LYMPHOCYTES # BLD AUTO: 1.13 K/UL (ref 1–4.8)
LYMPHOCYTES # BLD AUTO: 1.35 K/UL (ref 1–4.8)
LYMPHOCYTES NFR BLD: 14.6 % (ref 22–41)
LYMPHOCYTES NFR BLD: 16 % (ref 22–41)
LYMPHOCYTES NFR BLD: 18.3 % (ref 22–41)
LYMPHOCYTES NFR BLD: 23.3 % (ref 22–41)
LYMPHOCYTES NFR BLD: 29.4 % (ref 22–41)
LYMPHOCYTES NFR BLD: 33.8 % (ref 22–41)
LYMPHOCYTES NFR BLD: 9.3 % (ref 22–41)
MACROCYTES BLD QL SMEAR: ABNORMAL
MAGNESIUM SERPL-MCNC: 1.9 MG/DL (ref 1.5–2.5)
MANUAL DIFF BLD: NORMAL
MANUAL DIFF BLD: NORMAL
MCH RBC QN AUTO: 32 PG (ref 27–33)
MCH RBC QN AUTO: 32.4 PG (ref 27–33)
MCH RBC QN AUTO: 32.5 PG (ref 27–33)
MCH RBC QN AUTO: 32.6 PG (ref 27–33)
MCH RBC QN AUTO: 33.2 PG (ref 27–33)
MCH RBC QN AUTO: 33.7 PG (ref 27–33)
MCH RBC QN AUTO: 33.9 PG (ref 27–33)
MCHC RBC AUTO-ENTMCNC: 30.9 G/DL (ref 33.6–35)
MCHC RBC AUTO-ENTMCNC: 31.3 G/DL (ref 33.6–35)
MCHC RBC AUTO-ENTMCNC: 31.7 G/DL (ref 33.6–35)
MCHC RBC AUTO-ENTMCNC: 32.2 G/DL (ref 33.6–35)
MCHC RBC AUTO-ENTMCNC: 32.3 G/DL (ref 33.6–35)
MCHC RBC AUTO-ENTMCNC: 32.4 G/DL (ref 33.6–35)
MCHC RBC AUTO-ENTMCNC: 32.9 G/DL (ref 33.6–35)
MCV RBC AUTO: 101.3 FL (ref 81.4–97.8)
MCV RBC AUTO: 102.8 FL (ref 81.4–97.8)
MCV RBC AUTO: 102.8 FL (ref 81.4–97.8)
MCV RBC AUTO: 103 FL (ref 81.4–97.8)
MCV RBC AUTO: 103.3 FL (ref 81.4–97.8)
MCV RBC AUTO: 103.9 FL (ref 81.4–97.8)
MCV RBC AUTO: 104.1 FL (ref 81.4–97.8)
MONOCYTES # BLD AUTO: 0.19 K/UL (ref 0–0.85)
MONOCYTES # BLD AUTO: 0.21 K/UL (ref 0–0.85)
MONOCYTES # BLD AUTO: 0.29 K/UL (ref 0–0.85)
MONOCYTES # BLD AUTO: 0.31 K/UL (ref 0–0.85)
MONOCYTES # BLD AUTO: 0.32 K/UL (ref 0–0.85)
MONOCYTES # BLD AUTO: 0.37 K/UL (ref 0–0.85)
MONOCYTES # BLD AUTO: 0.4 K/UL (ref 0–0.85)
MONOCYTES NFR BLD AUTO: 4 % (ref 0–13.4)
MONOCYTES NFR BLD AUTO: 5.1 % (ref 0–13.4)
MONOCYTES NFR BLD AUTO: 5.2 % (ref 0–13.4)
MONOCYTES NFR BLD AUTO: 5.7 % (ref 0–13.4)
MONOCYTES NFR BLD AUTO: 6 % (ref 0–13.4)
MONOCYTES NFR BLD AUTO: 6.9 % (ref 0–13.4)
MONOCYTES NFR BLD AUTO: 7.5 % (ref 0–13.4)
MORPHOLOGY BLD-IMP: NORMAL
MORPHOLOGY BLD-IMP: NORMAL
NEUTROPHILS # BLD AUTO: 1.86 K/UL (ref 2–7.15)
NEUTROPHILS # BLD AUTO: 2.19 K/UL (ref 2–7.15)
NEUTROPHILS # BLD AUTO: 3.63 K/UL (ref 2–7.15)
NEUTROPHILS # BLD AUTO: 3.68 K/UL (ref 2–7.15)
NEUTROPHILS # BLD AUTO: 4 K/UL (ref 2–7.15)
NEUTROPHILS # BLD AUTO: 5.14 K/UL (ref 2–7.15)
NEUTROPHILS # BLD AUTO: 5.26 K/UL (ref 2–7.15)
NEUTROPHILS NFR BLD: 55.7 % (ref 44–72)
NEUTROPHILS NFR BLD: 56.8 % (ref 44–72)
NEUTROPHILS NFR BLD: 62.7 % (ref 44–72)
NEUTROPHILS NFR BLD: 68.5 % (ref 44–72)
NEUTROPHILS NFR BLD: 73.7 % (ref 44–72)
NEUTROPHILS NFR BLD: 74 % (ref 44–72)
NEUTROPHILS NFR BLD: 83.9 % (ref 44–72)
NEUTS BAND NFR BLD MANUAL: 1.7 % (ref 0–10)
NEUTS BAND NFR BLD MANUAL: 3 % (ref 0–10)
NRBC # BLD AUTO: 0.02 K/UL
NRBC # BLD AUTO: 0.03 K/UL
NRBC # BLD AUTO: 0.03 K/UL
NRBC # BLD AUTO: 0.04 K/UL
NRBC # BLD AUTO: 0.04 K/UL
NRBC # BLD AUTO: 0.05 K/UL
NRBC # BLD AUTO: 0.11 K/UL
NRBC BLD-RTO: 0.5 /100 WBC
NRBC BLD-RTO: 0.6 /100 WBC
NRBC BLD-RTO: 0.7 /100 WBC
NRBC BLD-RTO: 0.7 /100 WBC
NRBC BLD-RTO: 0.8 /100 WBC
NRBC BLD-RTO: 0.8 /100 WBC
NRBC BLD-RTO: 1.8 /100 WBC
NT-PROBNP SERPL IA-MCNC: 389 PG/ML (ref 0–125)
OUTPT INFUS CBC COMMENT OICOM: ABNORMAL
OVALOCYTES BLD QL SMEAR: NORMAL
PATHOLOGY CONSULT NOTE: NORMAL
PLATELET # BLD AUTO: 109 K/UL (ref 164–446)
PLATELET # BLD AUTO: 113 K/UL (ref 164–446)
PLATELET # BLD AUTO: 121 K/UL (ref 164–446)
PLATELET # BLD AUTO: 127 K/UL (ref 164–446)
PLATELET # BLD AUTO: 132 K/UL (ref 164–446)
PLATELET # BLD AUTO: 83 K/UL (ref 164–446)
PLATELET # BLD AUTO: 91 K/UL (ref 164–446)
PLATELET BLD QL SMEAR: NORMAL
PMV BLD AUTO: 10.1 FL (ref 9–12.9)
PMV BLD AUTO: 10.4 FL (ref 9–12.9)
PMV BLD AUTO: 10.5 FL (ref 9–12.9)
PMV BLD AUTO: 10.8 FL (ref 9–12.9)
PMV BLD AUTO: 10.9 FL (ref 9–12.9)
PMV BLD AUTO: 10.9 FL (ref 9–12.9)
PMV BLD AUTO: 9.8 FL (ref 9–12.9)
POLYCHROMASIA BLD QL SMEAR: NORMAL
POTASSIUM SERPL-SCNC: 3.5 MMOL/L (ref 3.6–5.5)
POTASSIUM SERPL-SCNC: 3.6 MMOL/L (ref 3.6–5.5)
POTASSIUM SERPL-SCNC: 3.8 MMOL/L (ref 3.6–5.5)
POTASSIUM SERPL-SCNC: 3.9 MMOL/L (ref 3.6–5.5)
POTASSIUM SERPL-SCNC: 4.2 MMOL/L (ref 3.6–5.5)
PROCALCITONIN SERPL-MCNC: 0.32 NG/ML
PRODUCT TYPE UPROD: NORMAL
PRODUCT TYPE UPROD: NORMAL
PROT SERPL-MCNC: 5.9 G/DL (ref 6–8.2)
PROT SERPL-MCNC: 6.1 G/DL (ref 6–8.2)
PROT SERPL-MCNC: 6.2 G/DL (ref 6–8.2)
PROT SERPL-MCNC: 6.9 G/DL (ref 6–8.2)
RBC # BLD AUTO: 2.12 M/UL (ref 4.2–5.4)
RBC # BLD AUTO: 2.3 M/UL (ref 4.2–5.4)
RBC # BLD AUTO: 2.3 M/UL (ref 4.2–5.4)
RBC # BLD AUTO: 2.5 M/UL (ref 4.2–5.4)
RBC # BLD AUTO: 2.59 M/UL (ref 4.2–5.4)
RBC # BLD AUTO: 2.67 M/UL (ref 4.2–5.4)
RBC # BLD AUTO: 2.75 M/UL (ref 4.2–5.4)
RBC BLD AUTO: PRESENT
RH BLD: NORMAL
RH BLD: NORMAL
SODIUM SERPL-SCNC: 133 MMOL/L (ref 135–145)
SODIUM SERPL-SCNC: 134 MMOL/L (ref 135–145)
SODIUM SERPL-SCNC: 137 MMOL/L (ref 135–145)
SODIUM SERPL-SCNC: 139 MMOL/L (ref 135–145)
SODIUM SERPL-SCNC: 142 MMOL/L (ref 135–145)
TIBC SERPL-MCNC: 264 UG/DL (ref 250–450)
TROPONIN T SERPL-MCNC: 21 NG/L (ref 6–19)
UIBC SERPL-MCNC: 142 UG/DL (ref 110–370)
UNIT STATUS USTAT: NORMAL
UNIT STATUS USTAT: NORMAL
VIT B12 SERPL-MCNC: 865 PG/ML (ref 211–911)
WBC # BLD AUTO: 3.3 K/UL (ref 4.8–10.8)
WBC # BLD AUTO: 3.9 K/UL (ref 4.8–10.8)
WBC # BLD AUTO: 5.2 K/UL (ref 4.8–10.8)
WBC # BLD AUTO: 5.4 K/UL (ref 4.8–10.8)
WBC # BLD AUTO: 5.8 K/UL (ref 4.8–10.8)
WBC # BLD AUTO: 6 K/UL (ref 4.8–10.8)
WBC # BLD AUTO: 7.1 K/UL (ref 4.8–10.8)

## 2023-01-01 PROCEDURE — 99214 OFFICE O/P EST MOD 30 MIN: CPT | Performed by: NURSE PRACTITIONER

## 2023-01-01 PROCEDURE — 99214 OFFICE O/P EST MOD 30 MIN: CPT | Performed by: INTERNAL MEDICINE

## 2023-01-01 PROCEDURE — 36415 COLL VENOUS BLD VENIPUNCTURE: CPT

## 2023-01-01 PROCEDURE — 88305 TISSUE EXAM BY PATHOLOGIST: CPT

## 2023-01-01 PROCEDURE — 93306 TTE W/DOPPLER COMPLETE: CPT | Mod: 26 | Performed by: INTERNAL MEDICINE

## 2023-01-01 PROCEDURE — 99212 OFFICE O/P EST SF 10 MIN: CPT | Performed by: NURSE PRACTITIONER

## 2023-01-01 PROCEDURE — 99212 OFFICE O/P EST SF 10 MIN: CPT | Performed by: INTERNAL MEDICINE

## 2023-01-01 PROCEDURE — 96374 THER/PROPH/DIAG INJ IV PUSH: CPT

## 2023-01-01 PROCEDURE — 84484 ASSAY OF TROPONIN QUANT: CPT

## 2023-01-01 PROCEDURE — 3074F SYST BP LT 130 MM HG: CPT | Performed by: STUDENT IN AN ORGANIZED HEALTH CARE EDUCATION/TRAINING PROGRAM

## 2023-01-01 PROCEDURE — 86901 BLOOD TYPING SEROLOGIC RH(D): CPT

## 2023-01-01 PROCEDURE — 99213 OFFICE O/P EST LOW 20 MIN: CPT | Performed by: INTERNAL MEDICINE

## 2023-01-01 PROCEDURE — 88341 IMHCHEM/IMCYTCHM EA ADD ANTB: CPT | Mod: 91

## 2023-01-01 PROCEDURE — 36430 TRANSFUSION BLD/BLD COMPNT: CPT

## 2023-01-01 PROCEDURE — 85025 COMPLETE CBC W/AUTO DIFF WBC: CPT

## 2023-01-01 PROCEDURE — RXMED WILLOW AMBULATORY MEDICATION CHARGE: Performed by: INTERNAL MEDICINE

## 2023-01-01 PROCEDURE — 86900 BLOOD TYPING SEROLOGIC ABO: CPT

## 2023-01-01 PROCEDURE — 82378 CARCINOEMBRYONIC ANTIGEN: CPT

## 2023-01-01 PROCEDURE — 99204 OFFICE O/P NEW MOD 45 MIN: CPT | Performed by: NURSE PRACTITIONER

## 2023-01-01 PROCEDURE — 93306 TTE W/DOPPLER COMPLETE: CPT

## 2023-01-01 PROCEDURE — 86850 RBC ANTIBODY SCREEN: CPT

## 2023-01-01 PROCEDURE — 99223 1ST HOSP IP/OBS HIGH 75: CPT | Mod: AI | Performed by: INTERNAL MEDICINE

## 2023-01-01 PROCEDURE — 700117 HCHG RX CONTRAST REV CODE 255: Performed by: EMERGENCY MEDICINE

## 2023-01-01 PROCEDURE — 71260 CT THORAX DX C+: CPT

## 2023-01-01 PROCEDURE — 71045 X-RAY EXAM CHEST 1 VIEW: CPT

## 2023-01-01 PROCEDURE — 3078F DIAST BP <80 MM HG: CPT | Performed by: STUDENT IN AN ORGANIZED HEALTH CARE EDUCATION/TRAINING PROGRAM

## 2023-01-01 PROCEDURE — 85018 HEMOGLOBIN: CPT

## 2023-01-01 PROCEDURE — 99215 OFFICE O/P EST HI 40 MIN: CPT | Performed by: INTERNAL MEDICINE

## 2023-01-01 PROCEDURE — 80053 COMPREHEN METABOLIC PANEL: CPT

## 2023-01-01 PROCEDURE — A9270 NON-COVERED ITEM OR SERVICE: HCPCS | Performed by: INTERNAL MEDICINE

## 2023-01-01 PROCEDURE — 99205 OFFICE O/P NEW HI 60 MIN: CPT | Performed by: INTERNAL MEDICINE

## 2023-01-01 PROCEDURE — 99215 OFFICE O/P EST HI 40 MIN: CPT | Performed by: STUDENT IN AN ORGANIZED HEALTH CARE EDUCATION/TRAINING PROGRAM

## 2023-01-01 PROCEDURE — 88342 IMHCHEM/IMCYTCHM 1ST ANTB: CPT

## 2023-01-01 PROCEDURE — 83550 IRON BINDING TEST: CPT

## 2023-01-01 PROCEDURE — 700111 HCHG RX REV CODE 636 W/ 250 OVERRIDE (IP): Performed by: INTERNAL MEDICINE

## 2023-01-01 PROCEDURE — 700102 HCHG RX REV CODE 250 W/ 637 OVERRIDE(OP): Performed by: INTERNAL MEDICINE

## 2023-01-01 PROCEDURE — 83880 ASSAY OF NATRIURETIC PEPTIDE: CPT

## 2023-01-01 PROCEDURE — 71046 X-RAY EXAM CHEST 2 VIEWS: CPT | Mod: TC | Performed by: PHYSICIAN ASSISTANT

## 2023-01-01 PROCEDURE — 86300 IMMUNOASSAY TUMOR CA 15-3: CPT

## 2023-01-01 PROCEDURE — 86923 COMPATIBILITY TEST ELECTRIC: CPT

## 2023-01-01 PROCEDURE — P9016 RBC LEUKOCYTES REDUCED: HCPCS

## 2023-01-01 PROCEDURE — 83540 ASSAY OF IRON: CPT

## 2023-01-01 PROCEDURE — 94760 N-INVAS EAR/PLS OXIMETRY 1: CPT

## 2023-01-01 PROCEDURE — 85014 HEMATOCRIT: CPT

## 2023-01-01 PROCEDURE — C1729 CATH, DRAINAGE: HCPCS

## 2023-01-01 PROCEDURE — 82607 VITAMIN B-12: CPT

## 2023-01-01 PROCEDURE — 770020 HCHG ROOM/CARE - TELE (206)

## 2023-01-01 PROCEDURE — 84145 PROCALCITONIN (PCT): CPT

## 2023-01-01 PROCEDURE — 94761 N-INVAS EAR/PLS OXIMETRY MLT: CPT | Performed by: INTERNAL MEDICINE

## 2023-01-01 PROCEDURE — 99213 OFFICE O/P EST LOW 20 MIN: CPT | Performed by: STUDENT IN AN ORGANIZED HEALTH CARE EDUCATION/TRAINING PROGRAM

## 2023-01-01 PROCEDURE — 93005 ELECTROCARDIOGRAM TRACING: CPT | Performed by: EMERGENCY MEDICINE

## 2023-01-01 PROCEDURE — 85007 BL SMEAR W/DIFF WBC COUNT: CPT

## 2023-01-01 PROCEDURE — A9503 TC99M MEDRONATE: HCPCS

## 2023-01-01 PROCEDURE — 83735 ASSAY OF MAGNESIUM: CPT

## 2023-01-01 PROCEDURE — 99239 HOSP IP/OBS DSCHRG MGMT >30: CPT | Performed by: INTERNAL MEDICINE

## 2023-01-01 PROCEDURE — 306780 HCHG STAT FOR TRANSFUSION PER CASE

## 2023-01-01 PROCEDURE — 88112 CYTOPATH CELL ENHANCE TECH: CPT

## 2023-01-01 PROCEDURE — 99215 OFFICE O/P EST HI 40 MIN: CPT | Mod: 25 | Performed by: INTERNAL MEDICINE

## 2023-01-01 PROCEDURE — 80048 BASIC METABOLIC PNL TOTAL CA: CPT

## 2023-01-01 PROCEDURE — 82728 ASSAY OF FERRITIN: CPT

## 2023-01-01 PROCEDURE — 1125F AMNT PAIN NOTED PAIN PRSNT: CPT | Performed by: STUDENT IN AN ORGANIZED HEALTH CARE EDUCATION/TRAINING PROGRAM

## 2023-01-01 PROCEDURE — 99285 EMERGENCY DEPT VISIT HI MDM: CPT

## 2023-01-01 PROCEDURE — 700105 HCHG RX REV CODE 258: Performed by: EMERGENCY MEDICINE

## 2023-01-01 PROCEDURE — 71275 CT ANGIOGRAPHY CHEST: CPT

## 2023-01-01 PROCEDURE — 700117 HCHG RX CONTRAST REV CODE 255: Performed by: INTERNAL MEDICINE

## 2023-01-01 RX ORDER — DIPHENHYDRAMINE HCL 25 MG
25 TABLET ORAL ONCE
Status: CANCELLED | OUTPATIENT
Start: 2023-01-01 | End: 2023-01-01

## 2023-01-01 RX ORDER — DIPHENHYDRAMINE HYDROCHLORIDE 50 MG/ML
25 INJECTION INTRAMUSCULAR; INTRAVENOUS PRN
Status: CANCELLED | OUTPATIENT
Start: 2023-01-01

## 2023-01-01 RX ORDER — 0.9 % SODIUM CHLORIDE 0.9 %
10 VIAL (ML) INJECTION PRN
Status: CANCELLED | OUTPATIENT
Start: 2023-01-01

## 2023-01-01 RX ORDER — SODIUM CHLORIDE 9 MG/ML
INJECTION, SOLUTION INTRAVENOUS CONTINUOUS
Status: CANCELLED | OUTPATIENT
Start: 2023-01-01

## 2023-01-01 RX ORDER — ONDANSETRON 4 MG/1
4 TABLET, ORALLY DISINTEGRATING ORAL EVERY 4 HOURS PRN
Status: DISCONTINUED | OUTPATIENT
Start: 2023-01-01 | End: 2023-01-01 | Stop reason: HOSPADM

## 2023-01-01 RX ORDER — PREDNISONE 20 MG/1
40 TABLET ORAL DAILY
Qty: 14 TABLET | Refills: 0 | Status: SHIPPED | OUTPATIENT
Start: 2023-01-01 | End: 2023-01-01

## 2023-01-01 RX ORDER — HEPARIN SODIUM (PORCINE) LOCK FLUSH IV SOLN 100 UNIT/ML 100 UNIT/ML
500 SOLUTION INTRAVENOUS PRN
Status: CANCELLED | OUTPATIENT
Start: 2023-01-01

## 2023-01-01 RX ORDER — ACETAMINOPHEN 325 MG/1
650 TABLET ORAL PRN
Status: CANCELLED | OUTPATIENT
Start: 2023-01-01

## 2023-01-01 RX ORDER — DEXAMETHASONE 0.5 MG/5ML
1 SOLUTION ORAL 4 TIMES DAILY
Qty: 4000 ML | Refills: 0 | Status: SHIPPED
Start: 2023-01-01 | End: 2023-01-01

## 2023-01-01 RX ORDER — EVEROLIMUS 5 MG/1
TABLET ORAL
Qty: 30 TABLET | Refills: 3 | Status: SHIPPED
Start: 2023-01-01 | End: 2023-01-01

## 2023-01-01 RX ORDER — ACETAMINOPHEN 325 MG/1
650 TABLET ORAL ONCE
Status: CANCELLED | OUTPATIENT
Start: 2023-01-01

## 2023-01-01 RX ORDER — ACETAMINOPHEN 325 MG/1
650 TABLET ORAL EVERY 6 HOURS PRN
Status: DISCONTINUED | OUTPATIENT
Start: 2023-01-01 | End: 2023-01-01 | Stop reason: HOSPADM

## 2023-01-01 RX ORDER — AMOXICILLIN 250 MG
2 CAPSULE ORAL 2 TIMES DAILY
Status: DISCONTINUED | OUTPATIENT
Start: 2023-01-01 | End: 2023-01-01 | Stop reason: HOSPADM

## 2023-01-01 RX ORDER — EXEMESTANE 25 MG/1
25 TABLET ORAL DAILY
Qty: 30 TABLET | Refills: 5 | Status: SHIPPED | OUTPATIENT
Start: 2023-01-01 | End: 2023-01-01

## 2023-01-01 RX ORDER — DOXYCYCLINE HYCLATE 100 MG
100 TABLET ORAL 2 TIMES DAILY
COMMUNITY
End: 2023-01-01

## 2023-01-01 RX ORDER — DEXAMETHASONE 0.5 MG/5ML
ELIXIR ORAL
Qty: 1200 ML | Refills: 3 | Status: SHIPPED
Start: 2023-01-01 | End: 2023-01-01

## 2023-01-01 RX ORDER — FUROSEMIDE 10 MG/ML
20 INJECTION INTRAMUSCULAR; INTRAVENOUS ONCE
Status: CANCELLED | OUTPATIENT
Start: 2023-01-01 | End: 2023-01-01

## 2023-01-01 RX ORDER — FUROSEMIDE 20 MG/1
20 TABLET ORAL DAILY
COMMUNITY
End: 2023-01-01 | Stop reason: SDUPTHER

## 2023-01-01 RX ORDER — 0.9 % SODIUM CHLORIDE 0.9 %
VIAL (ML) INJECTION PRN
Status: CANCELLED | OUTPATIENT
Start: 2023-01-01

## 2023-01-01 RX ORDER — DOXYCYCLINE HYCLATE 100 MG
100 TABLET ORAL 2 TIMES DAILY
Qty: 14 TABLET | Refills: 0 | Status: SHIPPED | OUTPATIENT
Start: 2023-01-01 | End: 2023-01-01

## 2023-01-01 RX ORDER — FUROSEMIDE 20 MG/1
20 TABLET ORAL DAILY
Status: DISCONTINUED | OUTPATIENT
Start: 2023-01-01 | End: 2023-01-01 | Stop reason: HOSPADM

## 2023-01-01 RX ORDER — POLYETHYLENE GLYCOL 3350 17 G/17G
1 POWDER, FOR SOLUTION ORAL
Status: DISCONTINUED | OUTPATIENT
Start: 2023-01-01 | End: 2023-01-01 | Stop reason: HOSPADM

## 2023-01-01 RX ORDER — DIAZEPAM 5 MG/1
5 TABLET ORAL 2 TIMES DAILY PRN
Status: DISCONTINUED | OUTPATIENT
Start: 2023-01-01 | End: 2023-01-01 | Stop reason: HOSPADM

## 2023-01-01 RX ORDER — PREDNISONE 5 MG/1
TABLET ORAL
Qty: 90 TABLET | Refills: 1 | Status: SHIPPED
Start: 2023-01-01 | End: 2023-01-01

## 2023-01-01 RX ORDER — 0.9 % SODIUM CHLORIDE 0.9 %
3 VIAL (ML) INJECTION PRN
Status: CANCELLED | OUTPATIENT
Start: 2023-01-01

## 2023-01-01 RX ORDER — DIAZEPAM 5 MG/1
5 TABLET ORAL EVERY 6 HOURS PRN
Qty: 30 TABLET | Refills: 0 | Status: SHIPPED | OUTPATIENT
Start: 2023-01-01 | End: 2023-01-01

## 2023-01-01 RX ORDER — AMOXICILLIN AND CLAVULANATE POTASSIUM 875; 125 MG/1; MG/1
1 TABLET, FILM COATED ORAL 2 TIMES DAILY
Qty: 14 TABLET | Refills: 0 | Status: SHIPPED | OUTPATIENT
Start: 2023-01-01 | End: 2023-01-01

## 2023-01-01 RX ORDER — PROCHLORPERAZINE MALEATE 10 MG
10 TABLET ORAL EVERY 6 HOURS PRN
Qty: 30 TABLET | Refills: 3 | Status: SHIPPED
Start: 2023-01-01 | End: 2023-12-09

## 2023-01-01 RX ORDER — ENOXAPARIN SODIUM 100 MG/ML
40 INJECTION SUBCUTANEOUS DAILY
Status: DISCONTINUED | OUTPATIENT
Start: 2023-01-01 | End: 2023-01-01 | Stop reason: HOSPADM

## 2023-01-01 RX ORDER — PREDNISONE 20 MG/1
40 TABLET ORAL DAILY
COMMUNITY
End: 2023-01-01

## 2023-01-01 RX ORDER — EVEROLIMUS 5 MG/1
5 TABLET ORAL DAILY
Status: DISCONTINUED | OUTPATIENT
Start: 2023-01-01 | End: 2023-01-01

## 2023-01-01 RX ORDER — EXEMESTANE 25 MG/1
25 TABLET ORAL DAILY
Status: DISCONTINUED | OUTPATIENT
Start: 2023-01-01 | End: 2023-01-01

## 2023-01-01 RX ORDER — POTASSIUM CHLORIDE 20 MEQ/1
40 TABLET, EXTENDED RELEASE ORAL ONCE
Status: COMPLETED | OUTPATIENT
Start: 2023-01-01 | End: 2023-01-01

## 2023-01-01 RX ORDER — ACETAMINOPHEN 325 MG/1
650 TABLET ORAL ONCE
Status: COMPLETED | OUTPATIENT
Start: 2023-01-01 | End: 2023-01-01

## 2023-01-01 RX ORDER — SENNOSIDES 8.6 MG
650 CAPSULE ORAL DAILY
COMMUNITY

## 2023-01-01 RX ORDER — GABAPENTIN 300 MG/1
300 CAPSULE ORAL NIGHTLY
Status: DISCONTINUED | OUTPATIENT
Start: 2023-01-01 | End: 2023-01-01 | Stop reason: HOSPADM

## 2023-01-01 RX ORDER — ONDANSETRON 2 MG/ML
4 INJECTION INTRAMUSCULAR; INTRAVENOUS EVERY 4 HOURS PRN
Status: DISCONTINUED | OUTPATIENT
Start: 2023-01-01 | End: 2023-01-01 | Stop reason: HOSPADM

## 2023-01-01 RX ORDER — ONDANSETRON 8 MG/1
8 TABLET, ORALLY DISINTEGRATING ORAL EVERY 8 HOURS PRN
Qty: 60 TABLET | Refills: 1 | Status: SHIPPED
Start: 2023-01-01 | End: 2023-12-09

## 2023-01-01 RX ORDER — PREDNISONE 5 MG/1
5 TABLET ORAL DAILY
Qty: 30 TABLET | Refills: 0 | Status: SHIPPED
Start: 2023-01-01 | End: 2023-12-09

## 2023-01-01 RX ORDER — DIPHENHYDRAMINE HCL 25 MG
25 TABLET ORAL ONCE
Status: COMPLETED | OUTPATIENT
Start: 2023-01-01 | End: 2023-01-01

## 2023-01-01 RX ORDER — SODIUM CHLORIDE 9 MG/ML
INJECTION, SOLUTION INTRAVENOUS CONTINUOUS
Status: ACTIVE | OUTPATIENT
Start: 2023-01-01 | End: 2023-01-01

## 2023-01-01 RX ORDER — HYDROXYZINE HYDROCHLORIDE 25 MG/1
25 TABLET, FILM COATED ORAL 3 TIMES DAILY PRN
Qty: 30 TABLET | Refills: 0 | Status: SHIPPED
Start: 2023-01-01 | End: 2023-12-09

## 2023-01-01 RX ORDER — PREDNISONE 10 MG/1
5 TABLET ORAL DAILY
Status: DISCONTINUED | OUTPATIENT
Start: 2023-01-01 | End: 2023-01-01

## 2023-01-01 RX ORDER — PREDNISONE 20 MG/1
40 TABLET ORAL DAILY
Status: DISCONTINUED | OUTPATIENT
Start: 2023-01-01 | End: 2023-01-01

## 2023-01-01 RX ORDER — EXEMESTANE 25 MG/1
25 TABLET ORAL DAILY
Qty: 30 TABLET | Refills: 5 | Status: SHIPPED | OUTPATIENT
Start: 2023-01-01 | End: 2023-01-01 | Stop reason: SDUPTHER

## 2023-01-01 RX ORDER — BISACODYL 10 MG
10 SUPPOSITORY, RECTAL RECTAL
Status: DISCONTINUED | OUTPATIENT
Start: 2023-01-01 | End: 2023-01-01 | Stop reason: HOSPADM

## 2023-01-01 RX ORDER — ALBUTEROL SULFATE 90 UG/1
2 AEROSOL, METERED RESPIRATORY (INHALATION) EVERY 4 HOURS PRN
Qty: 1 EACH | Refills: 3 | Status: SHIPPED
Start: 2023-01-01 | End: 2023-12-09

## 2023-01-01 RX ORDER — AMOXICILLIN AND CLAVULANATE POTASSIUM 875; 125 MG/1; MG/1
1 TABLET, FILM COATED ORAL 2 TIMES DAILY
Status: ON HOLD | COMMUNITY
End: 2023-01-01

## 2023-01-01 RX ORDER — PREDNISONE 20 MG/1
20 TABLET ORAL DAILY
Status: DISCONTINUED | OUTPATIENT
Start: 2023-01-01 | End: 2023-01-01 | Stop reason: HOSPADM

## 2023-01-01 RX ORDER — FUROSEMIDE 20 MG/1
20 TABLET ORAL DAILY
Qty: 30 TABLET | Refills: 0 | Status: SHIPPED | OUTPATIENT
Start: 2023-01-01 | End: 2023-01-01

## 2023-01-01 RX ORDER — FUROSEMIDE 20 MG/1
20 TABLET ORAL DAILY
Qty: 60 TABLET | Refills: 1 | Status: SHIPPED
Start: 2023-01-01 | End: 2023-12-09

## 2023-01-01 RX ORDER — ALBUTEROL SULFATE 90 UG/1
2 AEROSOL, METERED RESPIRATORY (INHALATION) EVERY 4 HOURS PRN
Status: DISCONTINUED | OUTPATIENT
Start: 2023-01-01 | End: 2023-01-01 | Stop reason: HOSPADM

## 2023-01-01 RX ORDER — ALPRAZOLAM 1 MG/1
1 TABLET ORAL 3 TIMES DAILY PRN
Qty: 10 TABLET | Refills: 0 | Status: SHIPPED | OUTPATIENT
Start: 2023-01-01 | End: 2023-01-01

## 2023-01-01 RX ADMIN — PREDNISONE 20 MG: 20 TABLET ORAL at 05:05

## 2023-01-01 RX ADMIN — DIPHENHYDRAMINE HYDROCHLORIDE 25 MG: 25 TABLET ORAL at 14:53

## 2023-01-01 RX ADMIN — IOHEXOL 75 ML: 350 INJECTION, SOLUTION INTRAVENOUS at 13:19

## 2023-01-01 RX ADMIN — POTASSIUM CHLORIDE 40 MEQ: 1500 TABLET, EXTENDED RELEASE ORAL at 17:18

## 2023-01-01 RX ADMIN — ACETAMINOPHEN 650 MG: 325 TABLET ORAL at 14:53

## 2023-01-01 RX ADMIN — GABAPENTIN 300 MG: 300 CAPSULE ORAL at 17:18

## 2023-01-01 RX ADMIN — IOHEXOL 100 ML: 350 INJECTION, SOLUTION INTRAVENOUS at 09:00

## 2023-01-01 RX ADMIN — SODIUM CHLORIDE: 9 INJECTION, SOLUTION INTRAVENOUS at 14:40

## 2023-01-01 RX ADMIN — HYDROCORTISONE SODIUM SUCCINATE 100 MG: 100 INJECTION, POWDER, FOR SOLUTION INTRAMUSCULAR; INTRAVENOUS at 14:52

## 2023-01-01 RX ADMIN — FUROSEMIDE 20 MG: 20 TABLET ORAL at 05:05

## 2023-01-01 ASSESSMENT — ENCOUNTER SYMPTOMS
FEVER: 0
PSYCHIATRIC NEGATIVE: 1
NECK PAIN: 0
FEVER: 0
DIZZINESS: 0
DIAPHORESIS: 0
SHORTNESS OF BREATH: 1
CONSTIPATION: 0
COUGH: 1
BACK PAIN: 1
NECK PAIN: 1
NAUSEA: 1
SHORTNESS OF BREATH: 1
DYSPNEA AT REST: 0
BLURRED VISION: 0
HEADACHES: 0
HEMOPTYSIS: 0
VOMITING: 0
DOUBLE VISION: 0
EYES NEGATIVE: 1
HEARTBURN: 0
CARDIOVASCULAR NEGATIVE: 1
CARDIOVASCULAR NEGATIVE: 1
EYE DISCHARGE: 0
CHILLS: 0
SHORTNESS OF BREATH: 1
SHORTNESS OF BREATH: 1
COUGH: 1
NAUSEA: 0
VOMITING: 0
WHEEZING: 1
HEMOPTYSIS: 0
NEUROLOGICAL NEGATIVE: 1
PSYCHIATRIC NEGATIVE: 1
DIZZINESS: 0
NAUSEA: 1
NAUSEA: 0
PALPITATIONS: 0
VOMITING: 0
ORTHOPNEA: 0
NAUSEA: 0
PSYCHIATRIC NEGATIVE: 1
HEADACHES: 0
WHEEZING: 1
WEIGHT LOSS: 0
SHORTNESS OF BREATH: 1
MYALGIAS: 0
COUGH: 1
FEVER: 0
DIARRHEA: 0
PALPITATIONS: 0
NAUSEA: 1
WHEEZING: 1
NECK PAIN: 1
PALPITATIONS: 0
DIARRHEA: 0
WEIGHT LOSS: 0
DIARRHEA: 0
NAUSEA: 0
EYES NEGATIVE: 1
CARDIOVASCULAR NEGATIVE: 1
NEUROLOGICAL NEGATIVE: 1
COUGH: 1
NECK PAIN: 1
FEVER: 0
FOCAL WEAKNESS: 0
SHORTNESS OF BREATH: 1
COUGH: 1
WEAKNESS: 0
HEADACHES: 0
HEMOPTYSIS: 0
BACK PAIN: 1
WEIGHT LOSS: 0
ABDOMINAL PAIN: 0
DIZZINESS: 0
SHORTNESS OF BREATH: 1
FEVER: 0
FALLS: 0
PHOTOPHOBIA: 0
DIZZINESS: 0
COUGH: 1
NAUSEA: 0
WHEEZING: 1
COUGH: 1
HEMOPTYSIS: 0
CONSTIPATION: 0
CHILLS: 0
WEIGHT LOSS: 0
CONSTIPATION: 0
SHORTNESS OF BREATH: 1
COUGH: 1
HEMOPTYSIS: 0
COUGH: 1
EYES NEGATIVE: 1
FEVER: 0
VOMITING: 0
CHEST TIGHTNESS: 0
COUGH: 1
WHEEZING: 1
NAUSEA: 1
CHILLS: 0
PALPITATIONS: 0
HEARTBURN: 0
NEUROLOGICAL NEGATIVE: 1
SPEECH CHANGE: 0
CLAUDICATION: 0
SHORTNESS OF BREATH: 1
DIZZINESS: 0
COUGH: 1
VOMITING: 0
PALPITATIONS: 0
BACK PAIN: 1
CHILLS: 0
CHILLS: 0
WEIGHT LOSS: 1
CONSTIPATION: 0
SHORTNESS OF BREATH: 1
CHILLS: 0
WEAKNESS: 1
DIARRHEA: 0
DIARRHEA: 0
CONSTIPATION: 0
HEMOPTYSIS: 0
VOMITING: 0
HEMOPTYSIS: 0
HEARTBURN: 0
HEADACHES: 0
SPUTUM PRODUCTION: 0

## 2023-01-01 ASSESSMENT — FIBROSIS 4 INDEX
FIB4 SCORE: 3.59
FIB4 SCORE: 4.84
FIB4 SCORE: 3.1
FIB4 SCORE: 3.84
FIB4 SCORE: 3.84
FIB4 SCORE: 2.95
FIB4 SCORE: 6.34
FIB4 SCORE: 6.34
FIB4 SCORE: 3.1
FIB4 SCORE: 2.95
FIB4 SCORE: 5.79
FIB4 SCORE: 3.84
FIB4 SCORE: 6.34
FIB4 SCORE: 4.84
FIB4 SCORE: 3.84
FIB4 SCORE: 4.84
FIB4 SCORE: 3.59

## 2023-01-01 ASSESSMENT — PATIENT HEALTH QUESTIONNAIRE - PHQ9
5. POOR APPETITE OR OVEREATING: 3 - NEARLY EVERY DAY
SUM OF ALL RESPONSES TO PHQ QUESTIONS 1-9: 11
8. MOVING OR SPEAKING SO SLOWLY THAT OTHER PEOPLE COULD HAVE NOTICED. OR THE OPPOSITE, BEING SO FIGETY OR RESTLESS THAT YOU HAVE BEEN MOVING AROUND A LOT MORE THAN USUAL: NOT AT ALL
4. FEELING TIRED OR HAVING LITTLE ENERGY: NEARLY EVERY DAY
CLINICAL INTERPRETATION OF PHQ2 SCORE: 3
5. POOR APPETITE OR OVEREATING: NEARLY EVERY DAY
3. TROUBLE FALLING OR STAYING ASLEEP OR SLEEPING TOO MUCH: SEVERAL DAYS
2. FEELING DOWN, DEPRESSED, IRRITABLE, OR HOPELESS: NEARLY EVERY DAY
SUM OF ALL RESPONSES TO PHQ9 QUESTIONS 1 AND 2: 3
1. LITTLE INTEREST OR PLEASURE IN DOING THINGS: NOT AT ALL
7. TROUBLE CONCENTRATING ON THINGS, SUCH AS READING THE NEWSPAPER OR WATCHING TELEVISION: NOT AT ALL
9. THOUGHTS THAT YOU WOULD BE BETTER OFF DEAD, OR OF HURTING YOURSELF: NOT AT ALL
6. FEELING BAD ABOUT YOURSELF - OR THAT YOU ARE A FAILURE OR HAVE LET YOURSELF OR YOUR FAMILY DOWN: SEVERAL DAYS
SUM OF ALL RESPONSES TO PHQ QUESTIONS 1-9: 12

## 2023-01-01 ASSESSMENT — COGNITIVE AND FUNCTIONAL STATUS - GENERAL
MOBILITY SCORE: 24
SUGGESTED CMS G CODE MODIFIER DAILY ACTIVITY: CH
DAILY ACTIVITIY SCORE: 24
SUGGESTED CMS G CODE MODIFIER MOBILITY: CH

## 2023-01-01 ASSESSMENT — PAIN DESCRIPTION - PAIN TYPE
TYPE: CHRONIC PAIN
TYPE: ACUTE PAIN
TYPE: ACUTE PAIN

## 2023-01-01 ASSESSMENT — LIFESTYLE VARIABLES
ON A TYPICAL DAY WHEN YOU DRINK ALCOHOL HOW MANY DRINKS DO YOU HAVE: 0
EVER HAD A DRINK FIRST THING IN THE MORNING TO STEADY YOUR NERVES TO GET RID OF A HANGOVER: NO
EVER FELT BAD OR GUILTY ABOUT YOUR DRINKING: NO
TOTAL SCORE: 0
CONSUMPTION TOTAL: NEGATIVE
TOTAL SCORE: 0
ALCOHOL_USE: NO
TOTAL SCORE: 0
AVERAGE NUMBER OF DAYS PER WEEK YOU HAVE A DRINK CONTAINING ALCOHOL: 0
HAVE PEOPLE ANNOYED YOU BY CRITICIZING YOUR DRINKING: NO
HOW MANY TIMES IN THE PAST YEAR HAVE YOU HAD 5 OR MORE DRINKS IN A DAY: 0
HAVE YOU EVER FELT YOU SHOULD CUT DOWN ON YOUR DRINKING: NO

## 2023-01-01 ASSESSMENT — PAIN SCALES - GENERAL
PAINLEVEL: 3=SLIGHT PAIN
PAINLEVEL: NO PAIN
PAINLEVEL: NO PAIN
PAINLEVEL: 7=MODERATE-SEVERE PAIN
PAINLEVEL: NO PAIN
PAINLEVEL: NO PAIN

## 2023-01-01 ASSESSMENT — COPD QUESTIONNAIRES
HAVE YOU SMOKED AT LEAST 100 CIGARETTES IN YOUR ENTIRE LIFE: NO/DON'T KNOW
COPD SCREENING SCORE: 2
DURING THE PAST 4 WEEKS HOW MUCH DID YOU FEEL SHORT OF BREATH: NONE/LITTLE OF THE TIME
DO YOU EVER COUGH UP ANY MUCUS OR PHLEGM?: NO/ONLY WITH OCCASIONAL COLDS OR INFECTIONS

## 2023-01-04 NOTE — ADDENDUM NOTE
Encounter addended by: Chnaell Vanessa, Med Ass't on: 1/4/2023 3:02 PM   Actions taken: Charge Capture section accepted

## 2023-01-04 NOTE — PROGRESS NOTES
Follow-up medical oncology: 12/2/2022      Referring Physician: Taniya Heck MD  Primary Care:  No primary care provider on file.    Diagnosis: Metastatic ER positive breast cancer    Chief Complaint: Metastatic ER positive breast cancer    History of Presenting Illness:  Rodrick Howard is a 72 y.o. female with a history of metastatic ER positive breast cancer who recently moved to [place] to live with her daughter and establish care here.  Her history dates back to 2018 when she had nipple inversion but did not seek medical care until October 2019 when she was found to have a large left breast mass.  Biopsy showed invasive ductal carcinoma lobular features ER/WI positive HER2 negative.  She had a left mastectomy which showed an 8 cm lesion with 13 of 13 lymph nodes positive.  CT scan post surgery showed diffuse bony disease and the patient declined biopsy at that time.  She was started on letrozole which she was maintained on until January 2021 when she was found to have progressive disease with increasing tumor markers.  She was then started on Faslodex and tolerated this very poorly and this was rapidly discontinued.  She went on tamoxifen for 1 year from February 2021 to [place] 2022 and had further progression in the bone and rising CA 27-29.  She restarted letrozole and Ibrance in February 2022 and has been doing reasonably well subsequently on 100 mg of Ibrance 2 out of 4 weeks.  In preparation for the moved to [place] she had recent reimaging including a bone scan on 9/7/2022 which showed hayden stable osseous metastases in the ribs sternum calvarium femurs and proximal humeri and thoracic spine.  Had a CT scan of the chest abdomen pelvis that time which showed a 3 mm nodule in the posterior right upper lobe and right axillary adenopathy which is smaller.  Diffuse sclerotic bony metastases have not changed.  Her 2729 has been informative and had been rising but apparently the last 1 taken in early  September showed a drop of 400 points from a high of around 2000.  She has a history of a left axillary vein clot and is on Eliquis for this. she also has left arm lymphedema which has not been evaluated lately.  She does not take any narcotic pain medication and tries to use as little medication as possible in general.  She has severe anxiety and distress over the move and takes an occasional Valium for this.  A [employer] comprehensive profile for genomics was done earlier this year and showed ER positivity and a PI 3 kinase mutation potentially actionable by Piqray.    Interval history 10/26/2022: She had some difficulty getting her Ibrance to her office but this was finally solved and she is back on schedule with 3-week on 1 week off.  In the past she has occasionally had to delay an extra week for fatigue and nausea but so far she is doing well with this cycle.  Lab tests with CBC are stable with her CA 27-29 2300 essentially stable.  We repeated the ultrasound on the left arm which showed patent veins so Eliquis was discontinued.  She went to lymphedema treatment and is now using a sleeve and compression massages to help her lymphedema.    Interval history 12/2/2022: She has been tolerating her Ibrance and letrozole okay except for increasing fatigue mainly in the afternoon.  She remains independent of ADLs.  She has had for the past 2 weeks bronchial infection and a chronic cough with light gray sputum.  She has not had any fever, shortness of breath, sweats or hemoptysis.  He has some nasal congestion as well.  Lymphedema is stable.  Tumor markers are up about 5% within the range of variation.    Interval history 1/4/2023: She has not done well over the past month.  She continues on Ibrance and letrozole but has had increasing fatigue.  She is also noted new nodules in her chest wall particularly in the left mastectomy scar but also above and below it which are itchy.  He has nausea also attributable to the  Ibrance which ondansetron helps.  CA 27-29 increased to the 3400 range which represents a substantial worsening.  Her hemoglobin remained fairly stable at 10.1 but she feels weaker and has less exercise tolerance.  She also had another sinus infection but this has cleared.      No past medical history on file.    No past surgical history on file.    Social History     Tobacco Use    Smoking status: Never    Smokeless tobacco: Never        No family history on file.    Allergies as of 01/04/2023 - Reviewed 01/04/2023   Allergen Reaction Noted    Codeine Vomiting and Nausea 09/29/2022    Hydrocodone Vomiting and Nausea 09/29/2022    Morphine Unspecified 09/29/2022    Oxycodone Vomiting and Nausea 09/29/2022         Current Outpatient Medications:     letrozole (FEMARA) 2.5 MG Tab, Take 1 Tablet by mouth every day., Disp: 90 Tablet, Rfl: 1    gabapentin (NEURONTIN) 300 MG Cap, Take 1 Capsule by mouth 3 times a day., Disp: 90 Capsule, Rfl: 3    gabapentin (NEURONTIN) 300 MG Cap, Take 1 Capsule by mouth every evening., Disp: 90 Capsule, Rfl: 3    levoFLOXacin (LEVAQUIN) 500 MG tablet, Take 1 Tablet by mouth every day., Disp: 7 Tablet, Rfl: 0    Ginger 500 MG Cap, by Other route., Disp: , Rfl:     acetaminophen (TYLENOL) 500 MG Tab, Take 600 mg by mouth., Disp: , Rfl:     Black Elderberry,Berry-Flower, 575 MG Cap, Take  by mouth., Disp: , Rfl:     diazePAM (VALIUM) 5 MG Tab, Take 5 mg by mouth., Disp: , Rfl:     Multiple Vitamins-Minerals (THERA-M) Tab, Take 1 Tablet by mouth every day., Disp: , Rfl:     Palbociclib 100 MG Tab, Take 100 mg by mouth every day., Disp: , Rfl:     diazePAM (VALIUM) 5 MG Tab, Take 5 mg by mouth every 6 hours as needed for Anxiety., Disp: , Rfl:     acetaminophen (TYLENOL) 500 MG Tab, Take 500-1,000 mg by mouth every 6 hours as needed., Disp: , Rfl:     lidocaine (XYLOCAINE) 4 % Solution, Apply  topically as needed., Disp: , Rfl:     therapeutic multivitamin-minerals (THERAGRAN-M) Tab, Take 1  "Tablet by mouth every day., Disp: , Rfl:     Elderberry 500 MG Cap, Take  by mouth., Disp: , Rfl:     ondansetron (ZOFRAN ODT) 8 MG TABLET DISPERSIBLE, Take 8 mg by mouth every 8 hours as needed for Nausea., Disp: , Rfl:     prochlorperazine (COMPAZINE) 10 MG Tab, Take 10 mg by mouth every 6 hours as needed., Disp: , Rfl:     Ginger 500 MG Cap, Take  by mouth., Disp: , Rfl:     Palbociclib 100 MG Cap, Take  by mouth., Disp: , Rfl:     apixaban (ELIQUIS) 2.5mg Tab, Take 1 Tablet by mouth every 12 hours. (Patient not taking: Reported on 10/26/2022), Disp: , Rfl:     apixaban (ELIQUIS) 2.5mg Tab, Take 2.5 mg by mouth 2 times a day. (Patient not taking: Reported on 10/26/2022), Disp: , Rfl:     Review of Systems:  Review of Systems   Constitutional:  Positive for malaise/fatigue.   HENT: Negative.     Eyes: Negative.    Respiratory:  Positive for cough.    Cardiovascular: Negative.    Gastrointestinal:  Positive for nausea.   Genitourinary: Negative.    Musculoskeletal:  Positive for back pain and neck pain.   Skin: Negative.    Neurological: Negative.    Endo/Heme/Allergies: Negative.    Psychiatric/Behavioral: Negative.          Physical Exam:  Vitals:    01/04/23 1240   BP: (!) 142/81   Pulse: 66   Resp: 19   Temp: 36.6 °C (97.8 °F)   TempSrc: Temporal   SpO2: 95%   Weight: 103 kg (227 lb 10 oz)   Height: 1.549 m (5' 1\")       DESC; KARNOFSKY SCALE WITH ECOG EQUIVALENT: 70, Cares for self; unable to carry on normal activity or to do active work (ECOG equivalent 1)    DISTRESS LEVEL: severe distress    Physical Exam  Constitutional:       Appearance: Normal appearance.   HENT:      Head: Normocephalic.      Mouth/Throat:      Mouth: Mucous membranes are moist.      Pharynx: Oropharynx is clear.   Eyes:      Extraocular Movements: Extraocular movements intact.      Conjunctiva/sclera: Conjunctivae normal.      Pupils: Pupils are equal, round, and reactive to light.   Cardiovascular:      Rate and Rhythm: Normal rate " and regular rhythm.      Pulses: Normal pulses.   Pulmonary:      Effort: Pulmonary effort is normal.      Breath sounds: Normal breath sounds.      Comments: Scattered rhonchi at the left base otherwise clear to AMP  Chest:      Comments: Multiple small cutaneous and subcutaneous nodules in the left mastectomy scar over the sternum, and under the left mastectomy scar.  The largest is about 8 mm.  Some are intradermal and erythematous and some are subcutaneous.  No left axillary adenopathy is noted.  Right breast is normal  Abdominal:      General: Abdomen is flat. Bowel sounds are normal.      Palpations: Abdomen is soft. There is no mass.   Musculoskeletal:         General: Normal range of motion.      Left upper arm: Edema present.      Comments: 2-3+ lymphedema of the entire left arm   Skin:     General: Skin is warm.   Neurological:      General: No focal deficit present.      Mental Status: She is alert and oriented to person, place, and time.   Psychiatric:         Mood and Affect: Mood normal.         Behavior: Behavior normal.            Labs:  No visits with results within 1 Month(s) from this visit.   Latest known visit with results is:   No results found for any previous visit.       Imaging:   All listed images below have been independently reviewed by me. I agree with the findings as summarized below:    CT ABDOMEN-PELVIS WITH IV CONTRAST    Result Date: 9/9/2022  CT CHEST ABDOMEN PELVIS W IV CONTRAST  9/9/2022 1:38 PM PROVIDED CLINICAL INDICATIONS:  metastatic breast cancer, restaging Malignant neoplasm of upper-outer quadrant of left female breast (HCC); Estrogen receptor positive status (ER+) ADDITIONAL CLINICAL HISTORY:  None. COMPARISON: February 3, 2022 and July 20, 2021. TECHNIQUE: Contiguous axial imaging of the chest, abdomen, and pelvis was performed with spiral technique after the administration of intravenous contrast. The images were reformatted in sagittal and coronal plane.  Dose  reduction techniques were used including but not limited to automated exposure control (AEC), iterative reconstruction technique, and/or mA and/or KV dose adjustments based on patient's size.  FINDINGS: CHEST: Heart: Normal size. No pericardial effusion. Aorta: Nonaneurysmal. Anatomic origination of the great vessels from the aortic arch. Mediastinum and leslie: No mediastinal or hilar adenopathy. Trachea and esophagus are normal. Thyroid is enlarged and shows heterogeneous attenuation with scattered nodules, unchanged from July 2021. Lungs: 3 mm nodule in the posterior right upper lobe is new from the previous study. No additional lung nodules. No infiltrate. Pleura: Small right pleural effusion is new from the previous exam. ABDOMEN: Liver: Low-attenuation is present throughout. No liver lesions. Portal vein is patent. Gallbladder and common bile duct: No calcified gallstones. Common bile duct has a normal caliber. Pancreas: Homogeneous enhancement. No peripancreatic fat stranding or fluid collections. Spleen: Normal size and homogeneous enhancement. Adrenal glands: Normal. Kidneys: Symmetric with homogeneous enhancement. No hydronephrosis. Aorta and IVC: Normal caliber. Stomach and bowel: Stomach is unremarkable. Small and large bowel are nondilated. No pericolic fat stranding. PELVIS: Retroperitoneum and mesentery: No mass or adenopathy. Peritoneum: No free intraperitoneal fluid or pneumoperitoneum. No mass. Genitourinary: Urinary bladder has a smooth contour. No bladder calculus. Uterus is unremarkable. Body wall: Previous left mastectomy and left axillary lymph node dissection. Prominent lymph nodes in the right axilla have increased in size significantly measuring up to 10 mm in short axis. Bones: Diffuse sclerotic bony metastases have not changed significantly.    IMPRESSION:  Widespread sclerotic bony metastatic disease has not changed significantly. Noncalcified nodule in the right upper lobe and mildly  enlarged right axillary lymph nodes are new from prior. Previous left mastectomy and left axillary lymph node dissection. Small right pleural effusion. Hepatic steatosis.    NM bone scan whole body    Result Date: 9/7/2022  NM BONE SCAN WHOLE BODY 9/7/2022 1:41 PM PROVIDED CLINICAL INDICATIONS:  metastatic breast cancer restaging Malignant neoplasm of upper-outer quadrant of left female breast (HCC); Estrogen receptor positive status (ER+) ADDITIONAL CLINICAL HISTORY:  None. COMPARISON:  Nuclear medicine bone scan dated 2/7/2022. TECHNIQUE: Whole body anterior and posterior planar images are obtained at 3 hours following intravenous administration of 25.2 mCi Tc-99m labeled MDP. FINDINGS:  Stable radiotracer uptake within the bilateral posterior ribs, sternum, calvarium, femurs and proximal humeri. Stable radiotracer uptake within the lower thoracic spine.    IMPRESSION:  Stable osseous metastases.       Pathology:      Assessment & Plan:  1.  Metastatic ER positive breast cancer with extensive bone metastases, currently on letrozole and Ibrance with new onset of multiple subcutaneous and cutaneous nodules, increasing symptoms of fatigue and nausea, creasing 27-29.  All consistent with progression of disease.  2.  Left arm lymphedema.  stAble  3.  History of left venous thrombosis in the axillary area.  Negative ultrasound now off anticoagulation  4.  Situational anxiety.  She will use Valium at her discretion  5.  Bronchitis resolved  6.  Nausea secondary to Ibrance.  7.  Poor tolerance of fulvestrant with hives necessitating discontinuation after 2 doses    Plan: we are going to hold her on her Ibrance as she is progressing.  I am going to get a CT of the chest abdomen pelvis and bone scan.  We are going to get a defined MBC to evaluate biomarkers.  She does have a known PIK3CA mutation that is actionable with Piqray or we could also consider and they are everolimus and exemestane or the Tivix clinical  trial.  A long discussion with her regarding the findings and the risks and benefits of additional therapy.  She understands that we are putting quality of life is the highest priority and will work to find an adequate therapy for her.  I will see her back in 2 weeks after we have the scan results.  Ondansetron was reordered  Any questions and concerns raised by the patient were answered to the best of my ability. Thank you for allowing me to participate in the care for this patient. Please feel free to contact me for any questions or concerns.     Nathan Arredondo M.D.

## 2023-01-17 NOTE — ADDENDUM NOTE
Encounter addended by: Chanell Vanessa, Med Ass't on: 1/17/2023 3:18 PM   Actions taken: Charge Capture section accepted

## 2023-01-17 NOTE — ADDENDUM NOTE
Encounter addended by: Laura Vora, Med Ass't on: 1/17/2023 3:10 PM   Actions taken: Charge Capture section accepted

## 2023-01-17 NOTE — PROGRESS NOTES
Follow-up medical oncology: 1/17/2023      Referring Physician: Taniya Heck MD  Primary Care:  No primary care provider on file.    Diagnosis: Metastatic ER positive breast cancer    Chief Complaint: Metastatic ER positive breast cancer    History of Presenting Illness:  Rodrick Howard is a 72 y.o. female with a history of metastatic ER positive breast cancer who recently moved to [place] to live with her daughter and establish care here.  Her history dates back to 2018 when she had nipple inversion but did not seek medical care until October 2019 when she was found to have a large left breast mass.  Biopsy showed invasive ductal carcinoma lobular features ER/GA positive HER2 negative.  She had a left mastectomy which showed an 8 cm lesion with 13 of 13 lymph nodes positive.  CT scan post surgery showed diffuse bony disease and the patient declined biopsy at that time.  She was started on letrozole which she was maintained on until January 2021 when she was found to have progressive disease with increasing tumor markers.  She was then started on Faslodex and tolerated this very poorly and this was rapidly discontinued.  She went on tamoxifen for 1 year from February 2021 to [place] 2022 and had further progression in the bone and rising CA 27-29.  She restarted letrozole and Ibrance in February 2022 and has been doing reasonably well subsequently on 100 mg of Ibrance 2 out of 4 weeks.  In preparation for the moved to [place] she had recent reimaging including a bone scan on 9/7/2022 which showed hayden stable osseous metastases in the ribs sternum calvarium femurs and proximal humeri and thoracic spine.  Had a CT scan of the chest abdomen pelvis that time which showed a 3 mm nodule in the posterior right upper lobe and right axillary adenopathy which is smaller.  Diffuse sclerotic bony metastases have not changed.  Her 2729 has been informative and had been rising but apparently the last 1 taken in early  September showed a drop of 400 points from a high of around 2000.  She has a history of a left axillary vein clot and is on Eliquis for this. she also has left arm lymphedema which has not been evaluated lately.  She does not take any narcotic pain medication and tries to use as little medication as possible in general.  She has severe anxiety and distress over the move and takes an occasional Valium for this.  A [employer] comprehensive profile for genomics was done earlier this year and showed ER positivity and a PI 3 kinase mutation potentially actionable by [facility]qray.    Interval history 10/26/2022: She had some difficulty getting her Ibrance to her office but this was finally solved and she is back on schedule with 3-week on 1 week off.  In the past she has occasionally had to delay an extra week for fatigue and nausea but so far she is doing well with this cycle.  Lab tests with CBC are stable with her CA 27-29 2300 essentially stable.  We repeated the ultrasound on the left arm which showed patent veins so Eliquis was discontinued.  She went to lymphedema treatment and is now using a sleeve and compression massages to help her lymphedema.    Interval history 12/2/2022: She has been tolerating her Ibrance and letrozole okay except for increasing fatigue mainly in the afternoon.  She remains independent of ADLs.  She has had for the past 2 weeks bronchial infection and a chronic cough with light gray sputum.  She has not had any fever, shortness of breath, sweats or hemoptysis.  He has some nasal congestion as well.  Lymphedema is stable.  Tumor markers are up about 5% within the range of variation.    Interval history 1/4/2023: She has not done well over the past month.  She continues on Ibrance and letrozole but has had increasing fatigue.  She is also noted new nodules in her chest wall particularly in the left mastectomy scar but also above and below it which are itchy.  He has nausea also attributable to the  Ibrance which ondansetron helps.  CA 27-29 increased to the 3400 range which represents a substantial worsening.  Her hemoglobin remained fairly stable at 10.1 but she feels weaker and has less exercise tolerance.  She also had another sinus infection but this has cleared.    Interval history 1/17/2023: She had a staging work-up which included CT of the chest abdomen pelvis demonstrating extensive diffuse predominantly sclerotic osseous metastases with a prominent lytic lesion in T3 in the manubrium.  No visceral involvement was noted.  Bone scan showed diffuse osseous metastatic disease without further characterization.  Overall her new subcutaneous nodules and the bony disease suggests progression without new organ involvement except for the skin.  The CT scan also bronchial wall thickening and mucous plugging in the right lower lobe.  She is more symptomatic from a lung perspective and gets short of breath with walking.  She is also coughing a lot.  I am going to send her to pulmonary for further evaluation and give her an albuterol inhaler in the interim.      History reviewed. No pertinent past medical history.    History reviewed. No pertinent surgical history.    Social History     Tobacco Use    Smoking status: Never    Smokeless tobacco: Never        History reviewed. No pertinent family history.    Allergies as of 01/17/2023 - Reviewed 01/17/2023   Allergen Reaction Noted    Codeine Vomiting and Nausea 09/29/2022    Hydrocodone Vomiting and Nausea 09/29/2022    Morphine Unspecified 09/29/2022    Oxycodone Vomiting and Nausea 09/29/2022         Current Outpatient Medications:     ondansetron (ZOFRAN ODT) 8 MG TABLET DISPERSIBLE, Take 1 Tablet by mouth every 8 hours as needed for Nausea for up to 60 doses., Disp: 60 Tablet, Rfl: 1    letrozole (FEMARA) 2.5 MG Tab, Take 1 Tablet by mouth every day., Disp: 90 Tablet, Rfl: 1    gabapentin (NEURONTIN) 300 MG Cap, Take 1 Capsule by mouth every evening., Disp: 90  Capsule, Rfl: 3    Ginger 500 MG Cap, by Other route., Disp: , Rfl:     acetaminophen (TYLENOL) 500 MG Tab, Take 600 mg by mouth., Disp: , Rfl:     Black Elderberry,Berry-Flower, 575 MG Cap, Take  by mouth., Disp: , Rfl:     Multiple Vitamins-Minerals (THERA-M) Tab, Take 1 Tablet by mouth every day., Disp: , Rfl:     diazePAM (VALIUM) 5 MG Tab, Take 5 mg by mouth every 6 hours as needed for Anxiety., Disp: , Rfl:     lidocaine (XYLOCAINE) 4 % Solution, Apply  topically as needed., Disp: , Rfl:     therapeutic multivitamin-minerals (THERAGRAN-M) Tab, Take 1 Tablet by mouth every day., Disp: , Rfl:     prochlorperazine (COMPAZINE) 10 MG Tab, Take 10 mg by mouth every 6 hours as needed., Disp: , Rfl:     gabapentin (NEURONTIN) 300 MG Cap, Take 1 Capsule by mouth 3 times a day., Disp: 90 Capsule, Rfl: 3    levoFLOXacin (LEVAQUIN) 500 MG tablet, Take 1 Tablet by mouth every day., Disp: 7 Tablet, Rfl: 0    apixaban (ELIQUIS) 2.5mg Tab, Take 1 Tablet by mouth every 12 hours. (Patient not taking: Reported on 10/26/2022), Disp: , Rfl:     diazePAM (VALIUM) 5 MG Tab, Take 5 mg by mouth., Disp: , Rfl:     Palbociclib 100 MG Tab, Take 100 mg by mouth every day., Disp: , Rfl:     acetaminophen (TYLENOL) 500 MG Tab, Take 500-1,000 mg by mouth every 6 hours as needed., Disp: , Rfl:     Elderberry 500 MG Cap, Take  by mouth., Disp: , Rfl:     Ginger 500 MG Cap, Take  by mouth., Disp: , Rfl:     apixaban (ELIQUIS) 2.5mg Tab, Take 2.5 mg by mouth 2 times a day. (Patient not taking: Reported on 10/26/2022), Disp: , Rfl:     Palbociclib 100 MG Cap, Take  by mouth., Disp: , Rfl:     Review of Systems:  Review of Systems   Constitutional:  Positive for malaise/fatigue.   HENT: Negative.     Eyes: Negative.    Respiratory:  Positive for cough.    Cardiovascular: Negative.    Gastrointestinal:  Positive for nausea.   Genitourinary: Negative.    Musculoskeletal:  Positive for back pain and neck pain.   Skin: Negative.    Neurological:  "Negative.    Endo/Heme/Allergies: Negative.    Psychiatric/Behavioral: Negative.          Physical Exam:  Vitals:    01/17/23 1408   BP: (!) 141/74   BP Location: Left arm   Patient Position: Sitting   BP Cuff Size: Adult   Pulse: 69   Resp: 16   Temp: 36.4 °C (97.5 °F)   TempSrc: Temporal   SpO2: 97%   Weight: 104 kg (228 lb 15.2 oz)   Height: 1.549 m (5' 1\")       DESC; KARNOFSKY SCALE WITH ECOG EQUIVALENT: 70, Cares for self; unable to carry on normal activity or to do active work (ECOG equivalent 1)    DISTRESS LEVEL: severe distress    Physical Exam  Constitutional:       Appearance: Normal appearance.   HENT:      Head: Normocephalic.      Mouth/Throat:      Mouth: Mucous membranes are moist.      Pharynx: Oropharynx is clear.   Eyes:      Extraocular Movements: Extraocular movements intact.      Conjunctiva/sclera: Conjunctivae normal.      Pupils: Pupils are equal, round, and reactive to light.   Cardiovascular:      Rate and Rhythm: Normal rate and regular rhythm.      Pulses: Normal pulses.   Pulmonary:      Effort: Pulmonary effort is normal.      Breath sounds: Normal breath sounds.      Comments: Scattered rhonchi at the left base otherwise clear to AMP  Chest:      Comments: Multiple small cutaneous and subcutaneous nodules in the left mastectomy scar over the sternum, and under the left mastectomy scar.  The largest is about 8 mm.  Some are intradermal and erythematous and some are subcutaneous.  No left axillary adenopathy is noted.  Right breast is normal  Abdominal:      General: Abdomen is flat. Bowel sounds are normal.      Palpations: Abdomen is soft. There is no mass.   Musculoskeletal:         General: Normal range of motion.      Left upper arm: Edema present.      Comments: 2-3+ lymphedema of the entire left arm   Skin:     General: Skin is warm.   Neurological:      General: No focal deficit present.      Mental Status: She is alert and oriented to person, place, and time.   Psychiatric:    "      Mood and Affect: Mood normal.         Behavior: Behavior normal.            Labs:  No visits with results within 1 Month(s) from this visit.   Latest known visit with results is:   No results found for any previous visit.       Imaging:   All listed images below have been independently reviewed by me. I agree with the findings as summarized below:    CT ABDOMEN-PELVIS WITH IV CONTRAST    Result Date: 9/9/2022  CT CHEST ABDOMEN PELVIS W IV CONTRAST  9/9/2022 1:38 PM PROVIDED CLINICAL INDICATIONS:  metastatic breast cancer, restaging Malignant neoplasm of upper-outer quadrant of left female breast (HCC); Estrogen receptor positive status (ER+) ADDITIONAL CLINICAL HISTORY:  None. COMPARISON: February 3, 2022 and July 20, 2021. TECHNIQUE: Contiguous axial imaging of the chest, abdomen, and pelvis was performed with spiral technique after the administration of intravenous contrast. The images were reformatted in sagittal and coronal plane.  Dose reduction techniques were used including but not limited to automated exposure control (AEC), iterative reconstruction technique, and/or mA and/or KV dose adjustments based on patient's size.  FINDINGS: CHEST: Heart: Normal size. No pericardial effusion. Aorta: Nonaneurysmal. Anatomic origination of the great vessels from the aortic arch. Mediastinum and leslie: No mediastinal or hilar adenopathy. Trachea and esophagus are normal. Thyroid is enlarged and shows heterogeneous attenuation with scattered nodules, unchanged from July 2021. Lungs: 3 mm nodule in the posterior right upper lobe is new from the previous study. No additional lung nodules. No infiltrate. Pleura: Small right pleural effusion is new from the previous exam. ABDOMEN: Liver: Low-attenuation is present throughout. No liver lesions. Portal vein is patent. Gallbladder and common bile duct: No calcified gallstones. Common bile duct has a normal caliber. Pancreas: Homogeneous enhancement. No peripancreatic fat  stranding or fluid collections. Spleen: Normal size and homogeneous enhancement. Adrenal glands: Normal. Kidneys: Symmetric with homogeneous enhancement. No hydronephrosis. Aorta and IVC: Normal caliber. Stomach and bowel: Stomach is unremarkable. Small and large bowel are nondilated. No pericolic fat stranding. PELVIS: Retroperitoneum and mesentery: No mass or adenopathy. Peritoneum: No free intraperitoneal fluid or pneumoperitoneum. No mass. Genitourinary: Urinary bladder has a smooth contour. No bladder calculus. Uterus is unremarkable. Body wall: Previous left mastectomy and left axillary lymph node dissection. Prominent lymph nodes in the right axilla have increased in size significantly measuring up to 10 mm in short axis. Bones: Diffuse sclerotic bony metastases have not changed significantly.    IMPRESSION:  Widespread sclerotic bony metastatic disease has not changed significantly. Noncalcified nodule in the right upper lobe and mildly enlarged right axillary lymph nodes are new from prior. Previous left mastectomy and left axillary lymph node dissection. Small right pleural effusion. Hepatic steatosis.    NM bone scan whole body    Result Date: 9/7/2022  NM BONE SCAN WHOLE BODY 9/7/2022 1:41 PM PROVIDED CLINICAL INDICATIONS:  metastatic breast cancer restaging Malignant neoplasm of upper-outer quadrant of left female breast (HCC); Estrogen receptor positive status (ER+) ADDITIONAL CLINICAL HISTORY:  None. COMPARISON:  Nuclear medicine bone scan dated 2/7/2022. TECHNIQUE: Whole body anterior and posterior planar images are obtained at 3 hours following intravenous administration of 25.2 mCi Tc-99m labeled MDP. FINDINGS:  Stable radiotracer uptake within the bilateral posterior ribs, sternum, calvarium, femurs and proximal humeri. Stable radiotracer uptake within the lower thoracic spine.    IMPRESSION:  Stable osseous metastases.       Pathology:      Assessment & Plan:  1.  Metastatic ER positive breast  cancer with extensive bone metastases, currently on letrozole and Ibrance with new onset of multiple subcutaneous and cutaneous nodules, increasing symptoms of fatigue and nausea, creasing 27-29.  All consistent with progression of disease, confirmed by extensive sclerotic metastases on CT and bone scan 1/23  2.  Left arm lymphedema.  stAble  3.  History of left venous thrombosis in the axillary area.  Negative ultrasound now off anticoagulation  4.  Situational anxiety.  She will use Valium at her discretion  5.  Bronchitis recurrent and worse with CT findings of mucous plugging and increasing symptomatology  6.  Nausea secondary to Ibrance.  Resolved  7.  Poor tolerance of fulvestrant with hives necessitating discontinuation after 2 doses    Plan: We discussed the [employer] clinical trial but she is not inclined to participate.  She is interested in additional endocrine therapy.  We decided after long discussion to try exemestane and everolimus.  We will start the everolimus at 5 mg a day with steroid mouthwash.  Exemestane has been ordered.  In addition she has gotten an albuterol inhaler for her pulmonary symptoms and I am referring her to Dr. Luis for further evaluation.  We will see her back in 4 weeks to assess her initial tolerance of exemestane and everolimus.  If well-tolerated we will consider escalating to 7 and half milligrams daily.  She asked about grapefruit juice and we emphasized that she should not take her everolimus within 2 hours of the grapefruit juice.      Any questions and concerns raised by the patient were answered to the best of my ability. Thank you for allowing me to participate in the care for this patient. Please feel free to contact me for any questions or concerns.     Nathan Arredondo M.D.

## 2023-01-24 NOTE — OP THERAPY DISCHARGE SUMMARY
Outpatient Physical Therapy  DISCHARGE SUMMARY NOTE      [facility] [address]  [address]  [place] [address]  Phone:  [phone]  Fax:  [phone]    Date of Visit: 01/24/2023    Patient: Rodrick Howard  YOB: 1950  MRN: [id number]     Referring Provider: Nathan Arredondo M.D.  [address] [place] [address],  [place] [address]   Referring Diagnosis Lymphedema [I89.0]         Patient last seen 10/27/2022. Appropriate for DC.     Zeinab Morel, PT    Date: 1/24/2023

## 2023-01-25 NOTE — TELEPHONE ENCOUNTER
Patient called Medical Oncology stating she has not started exemestane, everlimus and dexamethasone mouth wash.  She reported exemestane would be $80/month and dexamethasone $167/month.  New rx fro dexamethasone solution sent to her pharmacy and cost reduced to $10/month and per Juan MTZ, [facility], if pt fills Exemestane at [facility] and pays cash the cost can be reduced to $34/ 30 day supply or $58/90 day supply.  Per Fredrick BAUM, , informed pt the she does qualify for financial assistance through [employer]. She just needs to pay out of pocket and submit her receipts.  Called patient and updated her on new costs. Pt in agreement with POC.  Per Juan MTZ, application for manufacture assistance is pending for Everlimus. Pt states she will wait until we have a response to order the other medications.

## 2023-01-27 NOTE — PROGRESS NOTES
Pulmonary Clinic- Initial Consult    Date of Service: 1/27/2023    Referring Physician: Nathan Arredondo MD    Reason for Consult: Chronic bronchitis    Chief Complaint:   Chief Complaint   Patient presents with    Establish Care     Ref by АЛЕКСАНДР Luis M.D. for  Bronchitis    Results     CT-1/8/2023      HPI:   Rodrick Howard is a very pleasant 72 y.o. female never smoker referred to the pulmonary clinic for chronic bronchitis and cough since moving to [place] from [facility].  She has a history of ER positive stage IV breast cancer s/p mastectomy 2019 and bone metastases currently on letrozole and Ibrance. She recently established care in the oncology clinic with Dr Arredondo.      After getting COVID in 6/2022 she developed a persistent cough that lingered and became progressively worse after moving to [place] in 9/2022.  Her cough is productive of clear sputum. No fevers. She endorses persistent post-nasal drip since moving to [place]. She has an air purifier in her bedroom but sleeps with the windows open at night. She was prescribed an albuterol inhaler which did not help her symptoms.     CT 1/8/2023 demonstrated extensive sclerotic osseous metastatic lesions as well as bronchial wall thickening and mucous plugging within the right lower lobe as well as linear atelectasis in the right upper and lower lobes and a small new right-sided pleural effusion.      She is a never smoker.    No prior PFTs.  No eosinophilia on recent CBC. No hx of environmental or seasonal allergies.   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     Past Medical History:   Diagnosis Date    Cough     Shortness of breath     Sputum production     Wheezing      History reviewed. No pertinent surgical history.    Social History     Socioeconomic History    Marital status:      Spouse name: Not on file    Number of children: Not on file    Years of education: Not on file    Highest education level: Not on file    Occupational History    Not on file   Tobacco Use    Smoking status: Never    Smokeless tobacco: Never   Vaping Use    Vaping Use: Former    Substances: THC    Devices: Pre-filled or refillable cartridge   Substance and Sexual Activity    Alcohol use: Not Currently    Drug use: Yes     Types: Marijuana    Sexual activity: Not on file   Other Topics Concern    Not on file   Social History Narrative    Not on file     Social Determinants of Health     Financial Resource Strain: Not on file   Food Insecurity: Not on file   Transportation Needs: Not on file   Physical Activity: Not on file   Stress: Not on file   Social Connections: Not on file   Intimate Partner Violence: Not on file   Housing Stability: Not on file          History reviewed. No pertinent family history.    Current Outpatient Medications on File Prior to Visit   Medication Sig Dispense Refill    ondansetron (ZOFRAN ODT) 8 MG TABLET DISPERSIBLE Take 1 Tablet by mouth every 8 hours as needed for Nausea for up to 60 doses. 60 Tablet 1    letrozole (FEMARA) 2.5 MG Tab Take 1 Tablet by mouth every day. 90 Tablet 1    gabapentin (NEURONTIN) 300 MG Cap Take 1 Capsule by mouth every evening. 90 Capsule 3    Ginger 500 MG Cap by Other route.      acetaminophen (TYLENOL) 500 MG Tab Take 600 mg by mouth.      Black Elderberry,Berry-Flower, 575 MG Cap Take  by mouth.      lidocaine (XYLOCAINE) 4 % Solution Apply  topically as needed.      therapeutic multivitamin-minerals (THERAGRAN-M) Tab Take 1 Tablet by mouth every day.      dexamethasone (DECADRON) 0.5 MG/5ML solution Take 10 mL by mouth 4 times a day. (Patient not taking: Reported on 1/27/2023) 4000 mL 0    albuterol 108 (90 Base) MCG/ACT Aero Soln inhalation aerosol Inhale 2 Puffs every four hours as needed for Shortness of Breath. (Patient not taking: Reported on 1/27/2023) 1 Each 3    exemestane (AROMASIN) 25 MG tablet Take 1 Tablet by mouth every day. (Patient not taking: Reported on 1/27/2023) 30  "Tablet 5    Everolimus 5 MG Tab Take 5mg orally once daily (Patient not taking: Reported on 1/27/2023) 30 Tablet 3    Multiple Vitamins-Minerals (THERA-M) Tab Take 1 Tablet by mouth every day.      diazePAM (VALIUM) 5 MG Tab Take 5 mg by mouth every 6 hours as needed for Anxiety.      Elderberry 500 MG Cap Take  by mouth. (Patient not taking: Reported on 1/27/2023)      prochlorperazine (COMPAZINE) 10 MG Tab Take 10 mg by mouth every 6 hours as needed. (Patient not taking: Reported on 1/27/2023)       No current facility-administered medications on file prior to visit.     Allergies: Codeine, Hydrocodone, Morphine, and Oxycodone    ROS:   Review of Systems   HENT:  Positive for congestion.    Respiratory:  Positive for cough, shortness of breath and wheezing. Negative for hemoptysis.    All other systems reviewed and are negative.    Vitals:  /70 (BP Location: Left arm, Patient Position: Sitting, BP Cuff Size: Adult)   Pulse 81   Resp 16   Ht 1.549 m (5' 1\")   Wt 103 kg (228 lb)   SpO2 92%     Physical Exam:  Physical Exam  Vitals and nursing note reviewed.   Constitutional:       General: She is not in acute distress.     Appearance: Normal appearance. She is well-developed. She is not diaphoretic.      Comments: Very pleasant  Accompanied by supportive daughter   Eyes:      General: No scleral icterus.        Right eye: No discharge.         Left eye: No discharge.      Conjunctiva/sclera: Conjunctivae normal.      Pupils: Pupils are equal, round, and reactive to light.   Neck:      Thyroid: No thyromegaly.      Vascular: No JVD.   Cardiovascular:      Rate and Rhythm: Normal rate and regular rhythm.      Heart sounds: Normal heart sounds. No murmur heard.    No gallop.   Pulmonary:      Effort: Pulmonary effort is normal. No respiratory distress.      Breath sounds: Normal breath sounds. No wheezing or rales.   Abdominal:      General: There is no distension.      Palpations: Abdomen is soft.      " Tenderness: There is no abdominal tenderness. There is no guarding.   Musculoskeletal:         General: No tenderness.   Lymphadenopathy:      Cervical: No cervical adenopathy.   Skin:     General: Skin is warm.      Capillary Refill: Capillary refill takes less than 2 seconds.      Findings: No erythema or rash.   Neurological:      Mental Status: She is alert and oriented to person, place, and time.      Cranial Nerves: No cranial nerve deficit.      Sensory: No sensory deficit.      Motor: No abnormal muscle tone.   Psychiatric:         Behavior: Behavior normal.       Laboratory Data:    PFTs as reviewed by me personally show:  None for review at time of consultation    Imaging as reviewed by me personally show:    See HPI    Assessment/Plan:    Problem List Items Addressed This Visit       Chronic cough     Almost certainly due to or exacerbated by significant post nasal drip  Likely some aspiration of nasal drainage collecting in the right lower lobe causing CT findings  -- start sinus hygiene regimen  -- Nasacort/Flonase if no improvement  -- Agree with trial of antihistamine, consider addition of singulair as well if above is unsuccessful         Pleural effusion on right     Small, new effusion  Unclear etiology, underlying lung parenchyma on R side relatively unremarkable; cannot rule out malignant effusion in setting of stage IV breast CA  -- asymptomatic and too small for thoracentesis at this time, closely monitor symptoms and will evaluate effusion size when next CT performed for breast CA monitoring         Malignant neoplasm of breast metastatic to bone (HCC)     S/p mastectomy 2019  Letrozole and Ibrance  F/u h Dr Arredondo         Abnormal CT scan, lung     CT 1/2023: bronchial wall thickening and mucous plugging within the right lower lobe as well as linear atelectasis in the right upper and lower lobes  -- suspect related to significant PND/aspiration  --Plan of care as outlined above.            Return in about 3 months (around 4/27/2023).     This note was generated using voice recognition software which has a chance of producing errors of grammar and possibly content.  I have made every reasonable attempt to find and correct any obvious errors, but it should be expected that some may not be found prior to finalization of this note.    Time spent in record review prior to patient arrival, reviewing results, and in face-to-face encounter totaled 62 min, excluding any procedures if performed.  __________  Charan Luis MD  Pulmonary and Critical Care Medicine  [facility]

## 2023-01-28 PROBLEM — J90 PLEURAL EFFUSION ON RIGHT: Status: ACTIVE | Noted: 2023-01-01

## 2023-01-28 PROBLEM — R05.3 CHRONIC COUGH: Status: ACTIVE | Noted: 2023-01-01

## 2023-01-28 PROBLEM — R91.8 ABNORMAL CT SCAN, LUNG: Status: ACTIVE | Noted: 2023-01-01

## 2023-01-28 NOTE — ASSESSMENT & PLAN NOTE
Small, new effusion  Unclear etiology, underlying lung parenchyma on R side relatively unremarkable; cannot rule out malignant effusion in setting of stage IV breast CA  -- asymptomatic and too small for thoracentesis at this time, closely monitor symptoms and will evaluate effusion size when next CT performed for breast CA monitoring

## 2023-01-28 NOTE — ASSESSMENT & PLAN NOTE
CT 1/2023: bronchial wall thickening and mucous plugging within the right lower lobe as well as linear atelectasis in the right upper and lower lobes  -- suspect related to significant PND/aspiration  --Plan of care as outlined above.

## 2023-01-28 NOTE — ASSESSMENT & PLAN NOTE
Almost certainly due to or exacerbated by significant post nasal drip  Likely some aspiration of nasal drainage collecting in the right lower lobe causing CT findings  -- start sinus hygiene regimen  -- Nasacort/Flonase if no improvement  -- Agree with trial of antihistamine, consider addition of singulair as well if above is unsuccessful

## 2023-02-03 NOTE — TELEPHONE ENCOUNTER
Patient called Medical Oncology RN and left voicemail stating she has not started Everlimus because the pharmacist raised concerns about her taking it with her hx of lung issues.  Pt is concerned they may progress.  Attempted to call pt back and reached voicemail.

## 2023-02-08 NOTE — TELEPHONE ENCOUNTER
Discussed plan of care for patient with Dr. Arredondo regarding Everlimus and pt's concerns about her history of lung issues.  Per Dr. Arredondo discussed interstitial pneumonitis and that we will be monitoring her closely.  Reinforced importance of pt treating her cancer.  Pt reports she has arranged shipment of Everlimus and it should be arriving in the next day or two.  She states her daughter plans to  the exemestane on 2/9 from [facility]. Informed pt RN will follow up with Dr. Arredondo and [facility] Christiansen, to see if they want her to keep lab appointment on 2/14/2023 and appt on 2/15/2023 or push it out further since she has not started the medication yet. Pt verbalized understanding and agreed.

## 2023-02-10 NOTE — TELEPHONE ENCOUNTER
Pt's daughter called Medical Oncology RN to report pt started taking exemestane last night and started vomiting in the middle of the night.  She woke up early this morning and vomited three times.  She did take zofran ODT in the middle of the night, but reports it barely touched the nausea. She states pt is sleeping now. She reports pt still has not received Everlimus. Rx for compazine 10mg q6h PRN sent to [facility] pharmacy per Dr. Arredondo.

## 2023-02-28 NOTE — PROGRESS NOTES
Chief Complaint   Patient presents with    Cough     X 2 days with congestion.  Hx of Stage 4 Breast Cancer and long Covid cough. Negative home Covid test.        HISTORY OF PRESENT ILLNESS: Patient is a pleasant 72 y.o. female with a history of breast cancer and chronic cough who presents today due to symptoms which started 2 days ago. Pt reports an increase in cough.  Cough typically is clear but now is productive.  Reports increase in nasal congestion as well.  Endorses associated malaise, fatigue.  Denies any chest pain, shortness of breath.  Patient notes that she feels ill.  She does have an inhaler at home which helps with symptoms.  She does have an appoint with her oncologist in 2 days.      Patient Active Problem List    Diagnosis Date Noted    Chronic cough 01/28/2023    Pleural effusion on right 01/28/2023    Abnormal CT scan, lung 01/28/2023    Bronchitis 12/02/2022    Malignant neoplasm of breast metastatic to bone (HCC) 09/28/2022    Chronic deep venous thrombosis of left axillary vein (HCC) 09/28/2022    Lymphedema 09/28/2022       Allergies:Codeine, Hydrocodone, Morphine, and Oxycodone    Current Outpatient Medications Ordered in Epic   Medication Sig Dispense Refill    amoxicillin-clavulanate (AUGMENTIN) 875-125 MG Tab Take 1 Tablet by mouth 2 times a day for 7 days. 14 Tablet 0    doxycycline (VIBRAMYCIN) 100 MG Tab Take 1 Tablet by mouth 2 times a day for 7 days. 14 Tablet 0    prochlorperazine (COMPAZINE) 10 MG Tab Take 1 Tablet by mouth every 6 hours as needed for Nausea/Vomiting. 30 Tablet 3    exemestane (AROMASIN) 25 MG tablet Take 1 tablet by mouth every day. 30 Tablet 5    dexamethasone (DECADRON) 0.5 MG/5ML solution Take 10 mL by mouth 4 times a day. 4000 mL 0    albuterol 108 (90 Base) MCG/ACT Aero Soln inhalation aerosol Inhale 2 Puffs every four hours as needed for Shortness of Breath. 1 Each 3    Everolimus 5 MG Tab Take 5mg orally once daily 30 Tablet 3    ondansetron (ZOFRAN ODT) 8  "MG TABLET DISPERSIBLE Take 1 Tablet by mouth every 8 hours as needed for Nausea for up to 60 doses. 60 Tablet 1    gabapentin (NEURONTIN) 300 MG Cap Take 1 Capsule by mouth every evening. 90 Capsule 3    Ginger 500 MG Cap by Other route.      acetaminophen (TYLENOL) 500 MG Tab Take 600 mg by mouth.      Black Elderberry,Berry-Flower, 575 MG Cap Take  by mouth.      Multiple Vitamins-Minerals (THERA-M) Tab Take 1 Tablet by mouth every day.      diazePAM (VALIUM) 5 MG Tab Take 5 mg by mouth every 6 hours as needed for Anxiety.      lidocaine (XYLOCAINE) 4 % Solution Apply  topically as needed.      therapeutic multivitamin-minerals (THERAGRAN-M) Tab Take 1 Tablet by mouth every day.       No current [facility]-ordered facility-administered medications on file.       Past Medical History:   Diagnosis Date    Cough     Shortness of breath     Sputum production     Wheezing        Social History     Tobacco Use    Smoking status: Never    Smokeless tobacco: Never   Vaping Use    Vaping Use: Former    Substances: THC    Devices: Pre-filled or refillable cartridge   Substance Use Topics    Alcohol use: Not Currently    Drug use: Yes     Types: Marijuana       No family status information on file.   History reviewed. No pertinent family history.    ROS:  Review of Systems   Constitutional: Positive for fatigue, malaise. Negative for weight loss.  HENT: Positive for congestion. Negative for ear pain, nosebleeds, and neck pain.    Eyes: Negative for vision changes.   Cardiovascular: Negative for chest pain, palpitations, orthopnea and leg swelling.   Respiratory: Positive for cough and sputum production.   Gastrointestinal: Negative for abdominal pain, nausea, vomiting or diarrhea.   Skin: Negative for rash, diaphoresis.     Exam:  /64   Pulse 94   Temp 36.6 °C (97.9 °F) (Temporal)   Resp 18   Ht 1.549 m (5' 1\")   Wt 104 kg (230 lb)   SpO2 98%   General: well-nourished, well-developed female in NAD  Head: normocephalic, " atraumatic  Eyes: PERRLA, EOM within normal limits, no conjunctival injection, no scleral icterus, visual fields and acuity grossly intact.  Ears: normal shape and symmetry, no tenderness, no discharge. External canals are without any significant edema or erythema. Tympanic membranes are without any inflammation, no effusion. Gross auditory acuity is intact.  Nose: symmetrical without tenderness, mild discharge, erythema present bilateral nares.  Mouth/Throat: reasonable hygiene, no exudates or tonsillar enlargement. Mild erythema present.   Neck: no masses, range of motion within normal limits, no tracheal deviation.  Lymph: mild cervical adenopathy. No supraclavicular adenopathy.   Neuro: alert and oriented. Cranial nerves 1-12 grossly intact.   Cardiovascular: regular rate and rhythm without murmurs, rubs, or gallops. No edema.   Pulmonary: no distress. Chest is symmetrical with respiration. No wheezes, crackles, or rhonchi.  Diminished left lower lobe.  Musculoskeletal: appropriate muscle tone, gait is stable.  Skin: warm, dry, intact, no clubbing, no cyanosis.   Psych: appropriate mood, affect, judgement.         Assessment/Plan:  1. Acute lower respiratory tract infection  amoxicillin-clavulanate (AUGMENTIN) 875-125 MG Tab    doxycycline (VIBRAMYCIN) 100 MG Tab              We will treat with antibiotic therapy due to presentation and health history.  Augmentin and doxycycline as directed.  Home albuterol as directed.  Rest, increase fluids, hand and respiratory hygiene.   Supportive care, differential diagnoses, and indications for immediate follow-up discussed with patient.   Pathogenesis of diagnosis discussed including typical length and natural progression.  Instructed to return to clinic or nearest emergency department for any change in condition, further concerns, or worsening of symptoms.  Patient states understanding of the plan of care and discharge instructions.  Instructed to make an appointment  with her primary care provider in the next 3-5 days if not significantly improving and for further care.  Follow-up with oncology as planned.        Please note that this dictation was created using voice recognition software. I have made every reasonable attempt to correct obvious errors, but I expect that there are errors of grammar and possibly content that I did not discover before finalizing the note. N95 and safety glasses worn for the entire visit with the patient. Previous clinic visit encounter reviewed and considered in medical decision making today.       ASHLEY Allison.

## 2023-03-02 NOTE — PROGRESS NOTES
Follow-up medical oncology: 3/2/2023      Referring Physician: Taniya Heck MD  Primary Care:  No primary care provider on file.    Diagnosis: Metastatic ER positive breast cancer    Chief Complaint: Metastatic ER positive breast cancer    History of Presenting Illness:  Rodrick Howard is a 72 y.o. female with a history of metastatic ER positive breast cancer who recently moved to [place] to live with her daughter and establish care here.  Her history dates back to 2018 when she had nipple inversion but did not seek medical care until October 2019 when she was found to have a large left breast mass.  Biopsy showed invasive ductal carcinoma lobular features ER/CA positive HER2 negative.  She had a left mastectomy which showed an 8 cm lesion with 13 of 13 lymph nodes positive.  CT scan post surgery showed diffuse bony disease and the patient declined biopsy at that time.  She was started on letrozole which she was maintained on until January 2021 when she was found to have progressive disease with increasing tumor markers.  She was then started on Faslodex and tolerated this very poorly and this was rapidly discontinued.  She went on tamoxifen for 1 year from February 2021 to [place] 2022 and had further progression in the bone and rising CA 27-29.  She restarted letrozole and Ibrance in February 2022 and has been doing reasonably well subsequently on 100 mg of Ibrance 2 out of 4 weeks.  In preparation for the moved to [place] she had recent reimaging including a bone scan on 9/7/2022 which showed hayden stable osseous metastases in the ribs sternum calvarium femurs and proximal humeri and thoracic spine.  Had a CT scan of the chest abdomen pelvis that time which showed a 3 mm nodule in the posterior right upper lobe and right axillary adenopathy which is smaller.  Diffuse sclerotic bony metastases have not changed.  Her 2729 has been informative and had been rising but apparently the last 1 taken in early  September showed a drop of 400 points from a high of around 2000.  She has a history of a left axillary vein clot and is on Eliquis for this. she also has left arm lymphedema which has not been evaluated lately.  She does not take any narcotic pain medication and tries to use as little medication as possible in general.  She has severe anxiety and distress over the move and takes an occasional Valium for this.  A [facility] comprehensive profile for genomics was done earlier this year and showed ER positivity and a PI 3 kinase mutation potentially actionable by [facility].    Interval history 10/26/2022: She had some difficulty getting her Ibrance to her office but this was finally solved and she is back on schedule with 3-week on 1 week off.  In the past she has occasionally had to delay an extra week for fatigue and nausea but so far she is doing well with this cycle.  Lab tests with CBC are stable with her CA 27-29 2300 essentially stable.  We repeated the ultrasound on the left arm which showed patent veins so Eliquis was discontinued.  She went to lymphedema treatment and is now using a sleeve and compression massages to help her lymphedema.    Interval history 12/2/2022: She has been tolerating her Ibrance and letrozole okay except for increasing fatigue mainly in the afternoon.  She remains independent of ADLs.  She has had for the past 2 weeks bronchial infection and a chronic cough with light gray sputum.  She has not had any fever, shortness of breath, sweats or hemoptysis.  He has some nasal congestion as well.  Lymphedema is stable.  Tumor markers are up about 5% within the range of variation.    Interval history 1/4/2023: She has not done well over the past month.  She continues on Ibrance and letrozole but has had increasing fatigue.  She is also noted new nodules in her chest wall particularly in the left mastectomy scar but also above and below it which are itchy.  He has nausea also attributable to the  Ibrance which ondansetron helps.  CA 27-29 increased to the 3400 range which represents a substantial worsening.  Her hemoglobin remained fairly stable at 10.1 but she feels weaker and has less exercise tolerance.  She also had another sinus infection but this has cleared.    Interval history 1/17/2023: She had a staging work-up which included CT of the chest abdomen pelvis demonstrating extensive diffuse predominantly sclerotic osseous metastases with a prominent lytic lesion in T3 in the manubrium.  No visceral involvement was noted.  Bone scan showed diffuse osseous metastatic disease without further characterization.  Overall her new subcutaneous nodules and the bony disease suggests progression without new organ involvement except for the skin.  The CT scan also bronchial wall thickening and mucous plugging in the right lower lobe.  She is more symptomatic from a lung perspective and gets short of breath with walking.  She is also coughing a lot.  I am going to send her to pulmonary for further evaluation and give her an albuterol inhaler in the interim.    Interval history 3/2/2023: She started exemestane and then everolimus about a month ago.  She has had substantial fatigue from both drugs but no mucositis.  She is doing a Decadron mouthwash relatively faithfully.  1 week ago she began developing increased cough and productive green sputum that was thick.  She had no fever.  She went to urgent care several days ago and was felt to have a potential left-sided pneumonia based on physical exam.  No x-ray was done.  She was started on Augmentin and antibiotic with some improvement in her sputum production.  She continues to cough a lot but has used cough medication only intermittently.  She has used guaifenesin with some benefit.  Her O2 saturations have been good.  In the last 24 hours she has developed some diarrhea.  Labs from 2/28/2022 showed improved granulocyte count and were otherwise stable.  Her CA  27-29 is still pending but CEA was down slightly.  The nodules on the chest wall appear to have flattened out somewhat.      Past Medical History:   Diagnosis Date    Cough     Shortness of breath     Sputum production     Wheezing        History reviewed. No pertinent surgical history.    Social History     Tobacco Use    Smoking status: Never    Smokeless tobacco: Never   Vaping Use    Vaping Use: Former    Substances: THC    Devices: Pre-filled or refillable cartridge   Substance Use Topics    Alcohol use: Not Currently    Drug use: Yes     Types: Marijuana        History reviewed. No pertinent family history.    Allergies as of 03/02/2023 - Reviewed 03/02/2023   Allergen Reaction Noted    Codeine Vomiting and Nausea 09/29/2022    Hydrocodone Vomiting and Nausea 09/29/2022    Morphine Unspecified 09/29/2022    Oxycodone Vomiting and Nausea 09/29/2022         Current Outpatient Medications:     amoxicillin-clavulanate (AUGMENTIN) 875-125 MG Tab, Take 1 Tablet by mouth 2 times a day for 7 days., Disp: 14 Tablet, Rfl: 0    doxycycline (VIBRAMYCIN) 100 MG Tab, Take 1 Tablet by mouth 2 times a day for 7 days., Disp: 14 Tablet, Rfl: 0    prochlorperazine (COMPAZINE) 10 MG Tab, Take 1 Tablet by mouth every 6 hours as needed for Nausea/Vomiting., Disp: 30 Tablet, Rfl: 3    exemestane (AROMASIN) 25 MG tablet, Take 1 tablet by mouth every day., Disp: 30 Tablet, Rfl: 5    dexamethasone (DECADRON) 0.5 MG/5ML solution, Take 10 mL by mouth 4 times a day., Disp: 4000 mL, Rfl: 0    albuterol 108 (90 Base) MCG/ACT Aero Soln inhalation aerosol, Inhale 2 Puffs every four hours as needed for Shortness of Breath., Disp: 1 Each, Rfl: 3    Everolimus 5 MG Tab, Take 5mg orally once daily, Disp: 30 Tablet, Rfl: 3    ondansetron (ZOFRAN ODT) 8 MG TABLET DISPERSIBLE, Take 1 Tablet by mouth every 8 hours as needed for Nausea for up to 60 doses., Disp: 60 Tablet, Rfl: 1    gabapentin (NEURONTIN) 300 MG Cap, Take 1 Capsule by mouth every  evening., Disp: 90 Capsule, Rfl: 3    Ginger 500 MG Cap, by Other route., Disp: , Rfl:     acetaminophen (TYLENOL) 500 MG Tab, Take 600 mg by mouth., Disp: , Rfl:     Black Elderberry,Berry-Flower, 575 MG Cap, Take  by mouth., Disp: , Rfl:     Multiple Vitamins-Minerals (THERA-M) Tab, Take 1 Tablet by mouth every day., Disp: , Rfl:     diazePAM (VALIUM) 5 MG Tab, Take 5 mg by mouth every 6 hours as needed for Anxiety., Disp: , Rfl:     lidocaine (XYLOCAINE) 4 % Solution, Apply  topically as needed., Disp: , Rfl:     therapeutic multivitamin-minerals (THERAGRAN-M) Tab, Take 1 Tablet by mouth every day., Disp: , Rfl:     Review of Systems:  Review of Systems   Constitutional:  Positive for malaise/fatigue.   HENT: Negative.     Eyes: Negative.    Respiratory:  Positive for cough.    Cardiovascular: Negative.    Gastrointestinal:  Positive for nausea.   Genitourinary: Negative.    Musculoskeletal:  Positive for back pain and neck pain.   Skin: Negative.    Neurological: Negative.    Endo/Heme/Allergies: Negative.    Psychiatric/Behavioral: Negative.          Physical Exam:  Vitals:    03/02/23 1011   BP: 134/72   BP Location: Right arm   Patient Position: Sitting   BP Cuff Size: Adult   Pulse: 95   Resp: 20   Temp: 36.4 °C (97.5 °F)   TempSrc: Temporal   SpO2: 96%   Weight: 104 kg (229 lb 13.3 oz)       DESC; KARNOFSKY SCALE WITH ECOG EQUIVALENT: 70, Cares for self; unable to carry on normal activity or to do active work (ECOG equivalent 1)    DISTRESS LEVEL: severe distress    Physical Exam  Constitutional:       Appearance: Normal appearance.   HENT:      Head: Normocephalic.      Mouth/Throat:      Mouth: Mucous membranes are moist.      Pharynx: Oropharynx is clear.   Eyes:      Extraocular Movements: Extraocular movements intact.      Conjunctiva/sclera: Conjunctivae normal.      Pupils: Pupils are equal, round, and reactive to light.   Cardiovascular:      Rate and Rhythm: Normal rate and regular rhythm.  Extrasystoles are present.     Pulses: Normal pulses.   Pulmonary:      Effort: Pulmonary effort is normal. Tachypnea present.      Breath sounds: Examination of the right-lower field reveals wheezing. Examination of the left-lower field reveals wheezing. Wheezing present.      Comments: Scattered rhonchi at the left base otherwise clear to AMP  Chest:      Comments: Multiple small cutaneous and subcutaneous nodules in the left mastectomy scar over the sternum, and under the left mastectomy scar.  The largest is about 8 mm.  Some are intradermal and erythematous and some are subcutaneous.  No left axillary adenopathy is noted.  Right breast is normal  Abdominal:      General: Abdomen is flat. Bowel sounds are normal.      Palpations: Abdomen is soft. There is no mass.   Musculoskeletal:         General: Normal range of motion.      Left upper arm: Edema present.      Comments: 2-3+ lymphedema of the entire left arm   Skin:     General: Skin is warm.   Neurological:      General: No focal deficit present.      Mental Status: She is alert and oriented to person, place, and time.   Psychiatric:         Mood and Affect: Mood normal.         Behavior: Behavior normal.            Labs:  No visits with results within 1 Month(s) from this visit.   Latest known visit with results is:   No results found for any previous visit.       Imaging:   All listed images below have been independently reviewed by me. I agree with the findings as summarized below:    CT ABDOMEN-PELVIS WITH IV CONTRAST    Result Date: 9/9/2022  CT CHEST ABDOMEN PELVIS W IV CONTRAST  9/9/2022 1:38 PM PROVIDED CLINICAL INDICATIONS:  metastatic breast cancer, restaging Malignant neoplasm of upper-outer quadrant of left female breast (HCC); Estrogen receptor positive status (ER+) ADDITIONAL CLINICAL HISTORY:  None. COMPARISON: February 3, 2022 and July 20, 2021. TECHNIQUE: Contiguous axial imaging of the chest, abdomen, and pelvis was performed with spiral  technique after the administration of intravenous contrast. The images were reformatted in sagittal and coronal plane.  Dose reduction techniques were used including but not limited to automated exposure control (AEC), iterative reconstruction technique, and/or mA and/or KV dose adjustments based on patient's size.  FINDINGS: CHEST: Heart: Normal size. No pericardial effusion. Aorta: Nonaneurysmal. Anatomic origination of the great vessels from the aortic arch. Mediastinum and leslie: No mediastinal or hilar adenopathy. Trachea and esophagus are normal. Thyroid is enlarged and shows heterogeneous attenuation with scattered nodules, unchanged from July 2021. Lungs: 3 mm nodule in the posterior right upper lobe is new from the previous study. No additional lung nodules. No infiltrate. Pleura: Small right pleural effusion is new from the previous exam. ABDOMEN: Liver: Low-attenuation is present throughout. No liver lesions. Portal vein is patent. Gallbladder and common bile duct: No calcified gallstones. Common bile duct has a normal caliber. Pancreas: Homogeneous enhancement. No peripancreatic fat stranding or fluid collections. Spleen: Normal size and homogeneous enhancement. Adrenal glands: Normal. Kidneys: Symmetric with homogeneous enhancement. No hydronephrosis. Aorta and IVC: Normal caliber. Stomach and bowel: Stomach is unremarkable. Small and large bowel are nondilated. No pericolic fat stranding. PELVIS: Retroperitoneum and mesentery: No mass or adenopathy. Peritoneum: No free intraperitoneal fluid or pneumoperitoneum. No mass. Genitourinary: Urinary bladder has a smooth contour. No bladder calculus. Uterus is unremarkable. Body wall: Previous left mastectomy and left axillary lymph node dissection. Prominent lymph nodes in the right axilla have increased in size significantly measuring up to 10 mm in short axis. Bones: Diffuse sclerotic bony metastases have not changed significantly.    IMPRESSION:  Widespread  sclerotic bony metastatic disease has not changed significantly. Noncalcified nodule in the right upper lobe and mildly enlarged right axillary lymph nodes are new from prior. Previous left mastectomy and left axillary lymph node dissection. Small right pleural effusion. Hepatic steatosis.    NM bone scan whole body    Result Date: 9/7/2022  NM BONE SCAN WHOLE BODY 9/7/2022 1:41 PM PROVIDED CLINICAL INDICATIONS:  metastatic breast cancer restaging Malignant neoplasm of upper-outer quadrant of left female breast (HCC); Estrogen receptor positive status (ER+) ADDITIONAL CLINICAL HISTORY:  None. COMPARISON:  Nuclear medicine bone scan dated 2/7/2022. TECHNIQUE: Whole body anterior and posterior planar images are obtained at 3 hours following intravenous administration of 25.2 mCi Tc-99m labeled MDP. FINDINGS:  Stable radiotracer uptake within the bilateral posterior ribs, sternum, calvarium, femurs and proximal humeri. Stable radiotracer uptake within the lower thoracic spine.    IMPRESSION:  Stable osseous metastases.       Pathology:      Assessment & Plan:  1.  Metastatic ER positive breast cancer with extensive bone metastases, currently on letrozole and Ibrance with new onset of multiple subcutaneous and cutaneous nodules, increasing symptoms of fatigue and nausea, creasing 27-29.  All consistent with progression of disease, confirmed by extensive sclerotic metastases on CT and bone scan 1/23. started exemestane and everolimus 2/23 with fair tolerance.  2.  Left arm lymphedema.  stAble  3.  History of left venous thrombosis in the axillary area.  Negative ultrasound now off anticoagulation  4.  Situational anxiety.  She will use Valium at her discretion  5.  Bronchitis recurrent and worse with CT findings of mucous plugging and increasing symptomatology  6.  Nausea secondary to Ibrance.  Resolved  7.  Poor tolerance of fulvestrant with hives necessitating discontinuation after 2 doses  8.  Acute development of  increased cough shortness of breath and green sputum.  Started on antibiotics.  Rule out everolimus induced interstitial pneumonitis versus exacerbation of bronchitis versus bacterial pneumonitis.    Plan: We will get a stat chest x-ray today.  She will continue everolimus and exemestane for the time being.  She will increase her cough medicine and increase her albuterol due to the progressive wheezing.  She will continue antibiotics and add probiotics and Imodium as needed for diarrhea.  We will see her back in 1 week and recheck her counts and her physical exam at that time.    Any questions and concerns raised by the patient were answered to the best of my ability. Thank you for allowing me to participate in the care for this patient. Please feel free to contact me for any questions or concerns.     Nathan Arredondo M.D.

## 2023-03-08 NOTE — PROGRESS NOTES
Subjective     Rodrick Howard is a 72 y.o. female who presents with Breast Cancer (1 week fv)            HPI    Referring Physician: Taniya Heck MD  Primary Care:  None     Diagnosis: Metastatic ER positive breast cancer with extensive bone metastases     Chief Complaint: Patient seen today for evaluation for caner therapy with Everolimus and exemestane for breast cancer, for continued monitoring of symptoms and side effects of cancer treatments.     Oncology history of presenting illness:  Patient with a history of metastatic ER positive breast cancer who recently moved to [place] to live with her daughter and establish care here.  Her history dates back to 2018 when she noticed nipple inversion but did not seek out medical care until October 2019  When she found a large breast mass in the left breast.  She did undergo biopsy which showed invasive ductal carcinoma with lobular features ER/WA positive, HER2 negative.  She did undergo a left mastectomy which showed an 8 cm lesion with 13/13 lymph nodes positive.  She had a CT scan postsurgery which showed diffuse bony disease and the patient declined biopsy at that time.  She was started on letrozole which she continued on until January 2021 and found to have progressive disease with increasing tumor markers.  She was then started on Faslodex and tolerated this poorly and quickly discontinue this medication.  She then went on tamoxifen for 1 year from February 2021 to February 2022 where patient had noticed further progression of disease in the bone and a rising CA 27.29.  She then was restarted on letrozole and Ibrance in February 2022 and had been doing reasonably well with dose of 100 mg of Ibrance 2 out of 4 weeks.  In preparation for her moved to [place] she had recent imaging including a bone scan on 9/7/2022 which showed stable osseous metastatic disease in the ribs, sternum, calvarium, femurs and proximal humeri, and thoracic spine.  She had a CT scan of  the chest, abdomen and pelvis at that time which showed a 3 mm nodule in the posterior right upper lobe and right axillary adenopathy which was smaller.  Diffuse sclerotic bony mets have not changed.  Her CA 27.29 has been rising but apparently the last 1 taken in September 2022 showed a drop of 400 points from around 2000.  She did have a history of left axillary vein clot and is on Eliquis for this.  She also has left arm lymphedema.  She does not take any narcotic pain medication and tries to use as little medication as possible.  She does have severe anxiety and distress over the move and takes an occasional Valium for this. A [place] comprehensive profile for genomics was done earlier this year and showed ER positivity and a PI 3 kinase mutation potentially actionable by [facility].    Interval histories:  Interval history 10/26/2022: She had some difficulty getting her Ibrance to her office but this was finally solved and she is back on schedule with 3-week on 1 week off.  In the past she has occasionally had to delay an extra week for fatigue and nausea but so far she is doing well with this cycle.  Lab tests with CBC are stable with her CA 27-29 2300 essentially stable.  We repeated the ultrasound on the left arm which showed patent veins so Eliquis was discontinued.  She went to lymphedema treatment and is now using a sleeve and compression massages to help her lymphedema.    Interval history 12/2/2022: She has been tolerating her Ibrance and letrozole okay except for increasing fatigue mainly in the afternoon.  She remains independent of ADLs.  She has had for the past 2 weeks bronchial infection and a chronic cough with light gray sputum.  She has not had any fever, shortness of breath, sweats or hemoptysis.  He has some nasal congestion as well.  Lymphedema is stable.  Tumor markers are up about 5% within the range of variation.     Interval history 1/4/2023: She has not done well over the past month.  She  continues on Ibrance and letrozole but has had increasing fatigue.  She is also noted new nodules in her chest wall particularly in the left mastectomy scar but also above and below it which are itchy.  He has nausea also attributable to the Ibrance which ondansetron helps.  CA 27-29 increased to the 3400 range which represents a substantial worsening.  Her hemoglobin remained fairly stable at 10.1 but she feels weaker and has less exercise tolerance.  She also had another sinus infection but this has cleared.     Interval history 1/17/2023: She had a staging work-up which included CT of the chest abdomen pelvis demonstrating extensive diffuse predominantly sclerotic osseous metastases with a prominent lytic lesion in T3 in the manubrium.  No visceral involvement was noted.  Bone scan showed diffuse osseous metastatic disease without further characterization.  Overall her new subcutaneous nodules and the bony disease suggests progression without new organ involvement except for the skin.  The CT scan also bronchial wall thickening and mucous plugging in the right lower lobe.  She is more symptomatic from a lung perspective and gets short of breath with walking.  She is also coughing a lot.  I am going to send her to pulmonary for further evaluation and give her an albuterol inhaler in the interim.     Interval history 3/2/2023: She started exemestane and then everolimus about a month ago.  She has had substantial fatigue from both drugs but no mucositis.  She is doing a Decadron mouthwash relatively faithfully.  1 week ago she began developing increased cough and productive green sputum that was thick.  She had no fever.  She went to urgent care several days ago and was felt to have a potential left-sided pneumonia based on physical exam.  No x-ray was done.  She was started on Augmentin and antibiotic with some improvement in her sputum production.  She continues to cough a lot but has used cough medication only  intermittently.  She has used guaifenesin with some benefit.  Her O2 saturations have been good.  In the last 24 hours she has developed some diarrhea.  Labs from 2/28/2022 showed improved granulocyte count and were otherwise stable.  Her CA 27-29 is still pending but CEA was down slightly.  The nodules on the chest wall appear to have flattened out somewhat.    Interval history 03/08/23 (CAlsop, APRN):  Patient continues to have bronchitis symptoms.  She is not coughing as much as she had previously.  She stated sputum previously was green however now it is clear.  She does continue to have significant shortness of breath even with just communicating.  Fatigue is quite severe.  She states that she does not have an appetite.  She has tested multiple times that she if she had COVID and has been negative.  She was told to take Mucinex DM but that is causing significant dryness.  She is also taking Zyrtec which she believes helps with postnasal drip.  She is using humidifier as well as a steam within the bathroom to help break up the mucus.  She is using her inhaler as directed.  When she arrived to the office her oxygen saturation was 83% but then after a while it did go up to 93%.  The cough is still quite severe but stated she was able to sleep at least 3 hours straight last night for the first time.  She did complete the antibiotics.  She had some diarrhea from the antibiotics but that is resolved.  She is continuing to take her everolimus and her cancer antigen levels continue to trend down.  Discussed this with the patient today.    Treatment history:  10/2019: Diagnosis of left breast cancer s/p surery with questionable diffuse bony disease, not confirmed  11/2019 - 01/2021: Letrozole   01/2021: Progression of disease - Faslodex - not tolerated  02/2021 - 02/2022: Tamoxifen  02/2022: Progression of disease - Letrozole and Ibrance 100 mg  09/2022: Established with Dr. Arredondo  01/2023: Progression of disease    02/2023: Started Exemestane and Everolimus      Allergies   Allergen Reactions    Codeine Vomiting and Nausea    Hydrocodone Vomiting and Nausea    Morphine Unspecified     Patient Unsure     Oxycodone Vomiting and Nausea       Current Outpatient Medications on File Prior to Encounter   Medication Sig Dispense Refill    prochlorperazine (COMPAZINE) 10 MG Tab Take 1 Tablet by mouth every 6 hours as needed for Nausea/Vomiting. 30 Tablet 3    exemestane (AROMASIN) 25 MG tablet Take 1 tablet by mouth every day. 30 Tablet 5    dexamethasone (DECADRON) 0.5 MG/5ML solution Take 10 mL by mouth 4 times a day. 4000 mL 0    albuterol 108 (90 Base) MCG/ACT Aero Soln inhalation aerosol Inhale 2 Puffs every four hours as needed for Shortness of Breath. 1 Each 3    Everolimus 5 MG Tab Take 5mg orally once daily 30 Tablet 3    ondansetron (ZOFRAN ODT) 8 MG TABLET DISPERSIBLE Take 1 Tablet by mouth every 8 hours as needed for Nausea for up to 60 doses. 60 Tablet 1    gabapentin (NEURONTIN) 300 MG Cap Take 1 Capsule by mouth every evening. 90 Capsule 3    Ginger 500 MG Cap by Other route.      acetaminophen (TYLENOL) 500 MG Tab Take 600 mg by mouth.      Black Elderberry,Berry-Flower, 575 MG Cap Take  by mouth.      Multiple Vitamins-Minerals (THERA-M) Tab Take 1 Tablet by mouth every day.      diazePAM (VALIUM) 5 MG Tab Take 5 mg by mouth every 6 hours as needed for Anxiety.      lidocaine (XYLOCAINE) 4 % Solution Apply  topically as needed.      therapeutic multivitamin-minerals (THERAGRAN-M) Tab Take 1 Tablet by mouth every day.       No current facility-administered medications on file prior to encounter.          Review of Systems   Constitutional:  Positive for malaise/fatigue. Negative for chills, diaphoresis, fever and weight loss.   Respiratory:  Positive for cough, shortness of breath and wheezing. Negative for hemoptysis and sputum production.    Cardiovascular:  Negative for chest pain and palpitations.  "  Gastrointestinal:  Positive for nausea. Negative for constipation, diarrhea and vomiting.   Genitourinary:  Negative for dysuria.   Neurological:  Positive for weakness. Negative for dizziness and headaches.            Objective     /70 (BP Location: Right arm, Patient Position: Sitting, BP Cuff Size: Adult)   Pulse 85   Temp 36.7 °C (98 °F) (Temporal)   Resp 20   Ht 1.549 m (5' 1\")   Wt 104 kg (230 lb 0.8 oz)   SpO2 93%   BMI 43.47 kg/m²      Physical Exam  Vitals reviewed.   Constitutional:       General: She is not in acute distress.     Appearance: Normal appearance. She is not diaphoretic.   HENT:      Head: Normocephalic and atraumatic.   Cardiovascular:      Rate and Rhythm: Normal rate and regular rhythm.      Heart sounds: Normal heart sounds. No murmur heard.    No friction rub. No gallop.   Pulmonary:      Effort: No respiratory distress.      Breath sounds: Wheezing present.      Comments: Inspiratory wheezing anterior upper chest.  All of the lung sounds clear throughout.  Abdominal:      General: Bowel sounds are normal. There is no distension.      Palpations: Abdomen is soft.      Tenderness: There is no abdominal tenderness.   Musculoskeletal:         General: No swelling or tenderness. Normal range of motion.      Comments: Ambulates with a cane for stability   Skin:     General: Skin is warm and dry.   Neurological:      Mental Status: She is alert and oriented to person, place, and time.   Psychiatric:         Mood and Affect: Mood normal.         Behavior: Behavior normal.          DX-CHEST-2 VIEWS    Result Date: 3/2/2023  3/2/2023 11:49 AM HISTORY/REASON FOR EXAM:  Cough TECHNIQUE/EXAM DESCRIPTION AND NUMBER OF VIEWS: Two views of the chest. COMPARISON:  CT 1/7/2023. FINDINGS: Patchy bibasilar opacities Small bilateral pleural effusions. No pneumothorax. Normal cardiopericardial silhouette.     Small bilateral pleural effusions. Patchy bibasilar opacities, atelectasis or " infection.        Latest Reference Range & Units 11/28/22 12:15 12/29/22 12:58 02/28/23 12:52   Ca 27.29 <=39.0 U/mL 2468.1 (H) 3415.8 (H) 1995.9 (H)   Carcinoembryonic Antigen 0.0 - 3.0 ng/mL 95.7 (H) 105.0 (H) 97.7 (H)            Assessment & Plan       1. Malignant neoplasm of breast metastatic to bone (HCC)  CBC WITH DIFFERENTIAL      2. Chronic cough        3. Bronchitis        4. Neoplastic malignant related fatigue        5. Encounter for long-term (current) use of high-risk medication                Impression:  1.  Metastatic ER positive breast cancer with extensive bone metastases, previously on letrozole and Ibrance with new onset of multiple subcutaneous and cutaneous nodules, increasing symptoms of fatigue and nausea, increasing CA 27-29.  All consistent with progression of disease, confirmed by extensive sclerotic metastases on CT and bone scan 1/23. Started exemestane and everolimus 2/23 with fair tolerance.  2.  Left arm lymphedema. Stable  3.  History of left venous thrombosis in the axillary area. Negative ultrasound now off anticoagulation  4.  Situational anxiety. She will use Valium at her discretion  5.  Bronchitis recurrent and worse with CT findings of mucous plugging and increasing symptomatology  6.  Nausea secondary to Ibrance.  Resolved  7.  Poor tolerance of fulvestrant with hives necessitating discontinuation after 2 doses  8.  Acute development of increased cough shortness of breath and green sputum.  Started on antibiotics.  Rule out everolimus induced interstitial pneumonitis versus exacerbation of bronchitis versus bacterial pneumonitis.      Plan:  Patient continuing to have decreased trend in her cancer antigen levels.  Discussed with Dr. Arredondo and he would like her to continue on treatment as planned.  I have requested a CBC be completed in office today and again in 2 weeks prior to her follow-up visit.    With regards to patient's pulmonary issues I have requested patient  be seen again by Dr. Luis.  Personally spoke with pulmonologist, Dr. Luis who was agreed to see patient on Monday, 3/13/2023 to help optimize patient's respiratory status.    Patient to follow-up in the clinic in 2 weeks, or sooner if needed.      Please note that this dictation was created using voice recognition software. I have made every reasonable attempt to correct obvious errors, but I expect that there are errors of grammar and possibly content that I did not discover before finalizing the note.

## 2023-03-13 PROBLEM — R06.09 DYSPNEA ON EXERTION: Status: ACTIVE | Noted: 2023-01-01

## 2023-03-13 NOTE — ASSESSMENT & PLAN NOTE
S/p mastectomy 2019  Letrozole and Ibrance  Recently started exemestane and everolimus about a month ago  F/u Dr Arredondo

## 2023-03-13 NOTE — ASSESSMENT & PLAN NOTE
Shes drinking an excessive amount of water, +5lbs since her last visit despite decreased appetite, and has 3+ edema diffusely. She is wheezing which is suspect is 2/2 pulm edema from anasarca  -- start lasix 20mg PO QD; renal function preserved on provided OSH records from 3/10  -- start prednisone 40mg PO QD x 7 days  -- pt to followup in oncology clinic next week with MIR Delgado, if no improvement recommend repeat CT chest

## 2023-03-13 NOTE — TELEPHONE ENCOUNTER
Patient called Medical Oncology and left voicemail stating she has swelling on her right side, including her neck, whole arm and shoulder as well as her right ankle.  She states she also has a rash in the middle of her back that itches.  She states she went to the Emergency Room and was evaluated for blood clots and it came back negative. She states that she has chosen to stop her oncology medications. Per Dr. Arredondo informed pt that it is ok for her to hold her meds until she follows up on March 28.

## 2023-03-13 NOTE — PROGRESS NOTES
Pulmonary Clinic Note    Chief Complaint:  Chief Complaint   Patient presents with    Follow-Up     Chronic cough. Last seen 01/27/23    Other     CXR 03/02/23     HPI:   Rodrick Howard is a very pleasant 72 y.o. female never smoker referred to the pulmonary clinic for chronic bronchitis and cough since moving to [place] from [place], [place]. \[facility] a [facility] and Outlook.\"".  She has a history of ER positive stage IV breast cancer s/p mastectomy 2019 and bone metastases currently on letrozole and Ibrance. She recently established care in the oncology clinic with Dr Arredondo.       After getting COVID in 6/2022 she developed a persistent cough that lingered and became progressively worse after moving to [place] in 9/2022.  Her cough is productive of clear sputum. No fevers. She endorses persistent post-nasal drip since moving to [place]. She has an air purifier in her bedroom but sleeps with the windows open at night. She was prescribed an albuterol inhaler which did not help her symptoms.      CT 1/8/2023 demonstrated extensive sclerotic osseous metastatic lesions as well as bronchial wall thickening and mucous plugging within the right lower lobe as well as linear atelectasis in the right upper and lower lobes and a small new right-sided pleural effusion.       She is a never smoker.    No prior PFTs.  No eosinophilia on recent CBC. No hx of environmental or seasonal allergies.   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     Interval events since initial consultation in 1/2023:  Her symptoms at initial consultation were suggestive of persistent bronchitis attributed to significant PND/aspiration.  She started using a bedroom humidifier as well as consistently taking Zyrtec with no improvement in her respiratory symptoms.  She has developed progressively worsening fatigue, malaise and dyspnea on exertion with a cough that was initially productive of green sputum and is now clear after course of Augmentin.  In addition she has also  "developed anasarca. She is drinking 2 to 2-1/2 L of water a day due to concerns of developing dehydration.  Since her last visit she has gained 5 pounds.     She is on exemestane and was started on everolimus about a month ago.  She was prescribed Decadron oral rinses for mucositis however stopped using this.  She states her symptoms are \"50/50\", when asked to further describe she says she feels mainly limited by fatigue and malaise which she attributes to her oncologic regimen, as well as her shortness of breath.     Past Medical History:   Diagnosis Date    Cough     Shortness of breath     Sputum production     Wheezing        No past surgical history on file.    Social History     Socioeconomic History    Marital status:      Spouse name: Not on file    Number of children: Not on file    Years of education: Not on file    Highest education level: Not on file   Occupational History    Not on file   Tobacco Use    Smoking status: Never    Smokeless tobacco: Never   Vaping Use    Vaping Use: Former    Substances: THC    Devices: Pre-filled or refillable cartridge   Substance and Sexual Activity    Alcohol use: Not Currently    Drug use: Yes     Types: Marijuana    Sexual activity: Not on file   Other Topics Concern    Not on file   Social History Narrative    Not on file     Social Determinants of Health     Financial Resource Strain: Not on file   Food Insecurity: Not on file   Transportation Needs: Not on file   Physical Activity: Not on file   Stress: Not on file   Social Connections: Not on file   Intimate Partner Violence: Not on file   Housing Stability: Not on file          No family history on file.    Current Outpatient Medications on File Prior to Visit   Medication Sig Dispense Refill    prochlorperazine (COMPAZINE) 10 MG Tab Take 1 Tablet by mouth every 6 hours as needed for Nausea/Vomiting. 30 Tablet 3    exemestane (AROMASIN) 25 MG tablet Take 1 tablet by mouth every day. 30 Tablet 5    " "dexamethasone (DECADRON) 0.5 MG/5ML solution Take 10 mL by mouth 4 times a day. 4000 mL 0    albuterol 108 (90 Base) MCG/ACT Aero Soln inhalation aerosol Inhale 2 Puffs every four hours as needed for Shortness of Breath. 1 Each 3    Everolimus 5 MG Tab Take 5mg orally once daily 30 Tablet 3    ondansetron (ZOFRAN ODT) 8 MG TABLET DISPERSIBLE Take 1 Tablet by mouth every 8 hours as needed for Nausea for up to 60 doses. 60 Tablet 1    gabapentin (NEURONTIN) 300 MG Cap Take 1 Capsule by mouth every evening. 90 Capsule 3    Ginger 500 MG Cap by Other route.      acetaminophen (TYLENOL) 500 MG Tab Take 600 mg by mouth.      Black Elderberry,Berry-Flower, 575 MG Cap Take  by mouth.      Multiple Vitamins-Minerals (THERA-M) Tab Take 1 Tablet by mouth every day.      diazePAM (VALIUM) 5 MG Tab Take 5 mg by mouth every 6 hours as needed for Anxiety.      lidocaine (XYLOCAINE) 4 % Solution Apply  topically as needed.      therapeutic multivitamin-minerals (THERAGRAN-M) Tab Take 1 Tablet by mouth every day.       No current facility-administered medications on file prior to visit.       Allergies: Codeine, Hydrocodone, Morphine, and Oxycodone      ROS:   Review of Systems   Constitutional:  Positive for malaise/fatigue.   HENT:  Positive for congestion.    Respiratory:  Positive for cough, shortness of breath and wheezing. Negative for hemoptysis.    Cardiovascular:  Positive for leg swelling.   All other systems reviewed and are negative.    Vitals:  /62 (BP Location: Right arm, Patient Position: Sitting, BP Cuff Size: Large adult)   Pulse 94   Ht 1.549 m (5' 1\")   Wt 106 kg (233 lb)   SpO2 92%     Physical Exam:  Physical Exam  Vitals and nursing note reviewed.   Constitutional:       General: She is not in acute distress.     Appearance: Normal appearance. She is well-developed. She is not diaphoretic.      Comments: Very pleasant  Accompanied by supportive daughter   Eyes:      General: No scleral icterus.        " Right eye: No discharge.         Left eye: No discharge.      Conjunctiva/sclera: Conjunctivae normal.      Pupils: Pupils are equal, round, and reactive to light.   Neck:      Thyroid: No thyromegaly.      Vascular: No JVD.   Cardiovascular:      Rate and Rhythm: Normal rate and regular rhythm.      Heart sounds: Normal heart sounds. No murmur heard.    No gallop.   Pulmonary:      Effort: Pulmonary effort is normal. No respiratory distress.      Breath sounds: Wheezing present. No rales.   Abdominal:      General: There is no distension.      Palpations: Abdomen is soft.      Tenderness: There is no abdominal tenderness. There is no guarding.   Musculoskeletal:         General: No tenderness.      Right lower leg: Edema present.      Left lower leg: Edema present.   Lymphadenopathy:      Cervical: No cervical adenopathy.   Skin:     General: Skin is warm.      Capillary Refill: Capillary refill takes less than 2 seconds.      Findings: No erythema or rash.   Neurological:      Mental Status: She is alert and oriented to person, place, and time.      Cranial Nerves: No cranial nerve deficit.      Sensory: No sensory deficit.      Motor: No abnormal muscle tone.   Psychiatric:         Behavior: Behavior normal.     Laboratory Data:    PFTs as reviewed by me personally show:  See HPI    Imaging as reviewed by me personally show:    See HPI    Assessment/Plan:    Problem List Items Addressed This Visit       Dyspnea on exertion     Shes drinking an excessive amount of water, +5lbs since her last visit despite decreased appetite, and has 3+ edema diffusely. She is wheezing which is suspect is 2/2 pulm edema from anasarca  -- start lasix 20mg PO QD; renal function preserved on provided OSH records from 3/10  -- start prednisone 40mg PO QD x 7 days  -- pt to followup in oncology clinic next week with MIR Delgado, if no improvement recommend repeat CT chest         Relevant Medications    predniSONE (DELTASONE)  20 MG Tab    furosemide (LASIX) 20 MG Tab    Pleural effusion on right     Small, new effusion  Unclear etiology, underlying lung parenchyma on R side relatively unremarkable; cannot rule out malignant effusion in setting of stage IV breast CA  -- asymptomatic and too small for thoracentesis at this time, closely monitor symptoms and will evaluate effusion size when next CT performed for breast CA monitoring         Malignant neoplasm of breast metastatic to bone (HCC)     S/p mastectomy 2019  Letrozole and Ibrance  Recently started exemestane and everolimus about a month ago  F/u Dr Arredondo         Abnormal CT scan, lung     CT 1/2023: bronchial wall thickening and mucous plugging within the right lower lobe as well as linear atelectasis in the right upper and lower lobes  -- suspect related to significant PND/aspiration  --Plan of care as outlined above.          Return in about 4 weeks (around 4/10/2023).     This note was generated using voice recognition software which has a chance of producing errors of grammar and possibly content.  I have made every reasonable attempt to find and correct any obvious errors, but it should be expected that some may not be found prior to finalization of this note.    Time spent in record review prior to patient arrival, reviewing results, and in face-to-face encounter totaled 45 min, excluding any procedures if performed.  __________  Charan Luis MD  Pulmonary and Critical Care Medicine  [facility]

## 2023-03-20 NOTE — PROGRESS NOTES
Subjective     Rodrick Howard is a 72 y.o. female who presents with Breast Cancer (FV for symptoms )          HPI    Referring Physician: Taniya Heck MD  Primary Care:  None     Diagnosis: Metastatic ER positive breast cancer with extensive bone metastases     Chief Complaint: Patient seen today for evaluation for caner therapy with Everolimus and exemestane for breast cancer, for continued monitoring of symptoms and side effects of cancer treatments.      Oncology history of presenting illness:  Patient with a history of metastatic ER positive breast cancer who recently moved to [place] to live with her daughter and establish care here.  Her history dates back to 2018 when she noticed nipple inversion but did not seek out medical care until October 2019  When she found a large breast mass in the left breast.  She did undergo biopsy which showed invasive ductal carcinoma with lobular features ER/SD positive, HER2 negative.  She did undergo a left mastectomy which showed an 8 cm lesion with 13/13 lymph nodes positive.  She had a CT scan postsurgery which showed diffuse bony disease and the patient declined biopsy at that time.  She was started on letrozole which she continued on until January 2021 and found to have progressive disease with increasing tumor markers.  She was then started on Faslodex and tolerated this poorly and quickly discontinue this medication.  She then went on tamoxifen for 1 year from February 2021 to February 2022 where patient had noticed further progression of disease in the bone and a rising CA 27.29.  She then was restarted on letrozole and Ibrance in February 2022 and had been doing reasonably well with dose of 100 mg of Ibrance 2 out of 4 weeks.  In preparation for her moved to [place] she had recent imaging including a bone scan on 9/7/2022 which showed stable osseous metastatic disease in the ribs, sternum, calvarium, femurs and proximal humeri, and thoracic spine.  She had a CT  scan of the chest, abdomen and pelvis at that time which showed a 3 mm nodule in the posterior right upper lobe and right axillary adenopathy which was smaller.  Diffuse sclerotic bony mets have not changed.  Her CA 27.29 has been rising but apparently the last 1 taken in September 2022 showed a drop of 400 points from around 2000.  She did have a history of left axillary vein clot and is on Eliquis for this.  She also has left arm lymphedema.  She does not take any narcotic pain medication and tries to use as little medication as possible.  She does have severe anxiety and distress over the move and takes an occasional Valium for this. A [facility] comprehensive profile for genomics was done earlier this year and showed ER positivity and a PI 3 kinase mutation potentially actionable by [facility].     Interval histories:  Interval history 10/26/2022: She had some difficulty getting her Ibrance to her office but this was finally solved and she is back on schedule with 3-week on 1 week off.  In the past she has occasionally had to delay an extra week for fatigue and nausea but so far she is doing well with this cycle.  Lab tests with CBC are stable with her CA 27-29 2300 essentially stable.  We repeated the ultrasound on the left arm which showed patent veins so Eliquis was discontinued.  She went to lymphedema treatment and is now using a sleeve and compression massages to help her lymphedema.    Interval history 12/2/2022: She has been tolerating her Ibrance and letrozole okay except for increasing fatigue mainly in the afternoon.  She remains independent of ADLs.  She has had for the past 2 weeks bronchial infection and a chronic cough with light gray sputum.  She has not had any fever, shortness of breath, sweats or hemoptysis.  He has some nasal congestion as well.  Lymphedema is stable.  Tumor markers are up about 5% within the range of variation.     Interval history 1/4/2023: She has not done well over the past month.   She continues on Ibrance and letrozole but has had increasing fatigue.  She is also noted new nodules in her chest wall particularly in the left mastectomy scar but also above and below it which are itchy.  He has nausea also attributable to the Ibrance which ondansetron helps.  CA 27-29 increased to the 3400 range which represents a substantial worsening.  Her hemoglobin remained fairly stable at 10.1 but she feels weaker and has less exercise tolerance.  She also had another sinus infection but this has cleared.     Interval history 1/17/2023: She had a staging work-up which included CT of the chest abdomen pelvis demonstrating extensive diffuse predominantly sclerotic osseous metastases with a prominent lytic lesion in T3 in the manubrium.  No visceral involvement was noted.  Bone scan showed diffuse osseous metastatic disease without further characterization.  Overall her new subcutaneous nodules and the bony disease suggests progression without new organ involvement except for the skin.  The CT scan also bronchial wall thickening and mucous plugging in the right lower lobe.  She is more symptomatic from a lung perspective and gets short of breath with walking.  She is also coughing a lot.  I am going to send her to pulmonary for further evaluation and give her an albuterol inhaler in the interim.     Interval history 3/2/2023: She started exemestane and then everolimus about a month ago.  She has had substantial fatigue from both drugs but no mucositis.  She is doing a Decadron mouthwash relatively faithfully.  1 week ago she began developing increased cough and productive green sputum that was thick.  She had no fever.  She went to urgent care several days ago and was felt to have a potential left-sided pneumonia based on physical exam.  No x-ray was done.  She was started on Augmentin and antibiotic with some improvement in her sputum production.  She continues to cough a lot but has used cough medication  only intermittently.  She has used guaifenesin with some benefit.  Her O2 saturations have been good.  In the last 24 hours she has developed some diarrhea.  Labs from 2/28/2022 showed improved granulocyte count and were otherwise stable.  Her CA 27-29 is still pending but CEA was down slightly.  The nodules on the chest wall appear to have flattened out somewhat.     Interval history 03/08/23 (Jannie, APRN):  Patient continues to have bronchitis symptoms.  She is not coughing as much as she had previously.  She stated sputum previously was green however now it is clear.  She does continue to have significant shortness of breath even with just communicating.  Fatigue is quite severe.  She states that she does not have an appetite.  She has tested multiple times that she if she had COVID and has been negative.  She was told to take Mucinex DM but that is causing significant dryness.  She is also taking Zyrtec which she believes helps with postnasal drip.  She is using humidifier as well as a steam within the bathroom to help break up the mucus.  She is using her inhaler as directed.  When she arrived to the office her oxygen saturation was 83% but then after a while it did go up to 93%.  The cough is still quite severe but stated she was able to sleep at least 3 hours straight last night for the first time.  She did complete the antibiotics.  She had some diarrhea from the antibiotics but that is resolved.  She is continuing to take her everolimus and her cancer antigen levels continue to trend down.  Discussed this with the patient today.    Interval history 03/20/23 (Jannie, APRN):  Patient was seen by pulmonary last week on 3/13/2023 after request for revisit due to pulmonary issues.  Per Dr. Luis, pulmonologist he is concerned that patient was having fluid overload and he started her on Lasix 20 mg p.o. daily.  He is requesting patient have follow-up today to see how she is doing with regards to her  symptoms.    Per patient she stopped all of her breast cancer medications including everolimus and the exemestane last Monday, 3/13/2023 due to tolerability.  Was that day as well that she was started on the Lasix.  Since stopping the medication and starting the Lasix she has had significant improvement.  She has noticed the edema has improved but still noted in her upper and lower extremities.  She does note that she is breathing much better and she definitely has a better appetite.  She feels like she is moving around better as well at home.  She does state that some days she has increased urination another days or less.  She is limiting her water intake as well per Dr. Luis's recommendation.  She stated that she does not normally swell on the right side so she was extremely concerned and did present to the emergency department at [facility] within the last 2 weeks.  She stated they did an ultrasound to rule out a blood clot which was negative.  She has noticed that she does tend to get winded at times with activity and utilizes the albuterol as needed.  She did take last dose of her steroid last night.  She had experienced significant restless leg syndrome which she believes it is related to the steroid.  She had to take her Valium tablet in order to sleep because of this.    Patient is very concerned that her symptoms were caused by the chemotherapy pill, everolimus for her breast cancer.  She is not interested in taking this medication as her quality of life was quite poor.  She plans to discuss this with Dr. Arredondo at her next visit scheduled next week on 3/28/2023.       Treatment history:  10/2019: Diagnosis of left breast cancer s/p surery with questionable diffuse bony disease, not confirmed  11/2019 - 01/2021: Letrozole   01/2021: Progression of disease - Faslodex - not tolerated  02/2021 - 02/2022: Tamoxifen  02/2022: Progression of disease - Letrozole and Ibrance 100 mg  09/2022:  Established with Dr. Arredondo  01/2023: Progression of disease   02/2023: Started Exemestane and Everolimus  03/13/23: Patient stopped the Exemestane and Everolimus own d/t symptoms      Allergies   Allergen Reactions    Codeine Vomiting and Nausea    Hydrocodone Vomiting and Nausea    Morphine Unspecified     Patient Unsure     Oxycodone Vomiting and Nausea     Current Outpatient Medications on File Prior to Visit   Medication Sig Dispense Refill    predniSONE (DELTASONE) 20 MG Tab Take 2 Tablets by mouth every day for 7 days. 14 Tablet 0    furosemide (LASIX) 20 MG Tab Take 1 Tablet by mouth every day for 7 days. 30 Tablet 0    prochlorperazine (COMPAZINE) 10 MG Tab Take 1 Tablet by mouth every 6 hours as needed for Nausea/Vomiting. 30 Tablet 3    exemestane (AROMASIN) 25 MG tablet Take 1 tablet by mouth every day. 30 Tablet 5    dexamethasone (DECADRON) 0.5 MG/5ML solution Take 10 mL by mouth 4 times a day. 4000 mL 0    albuterol 108 (90 Base) MCG/ACT Aero Soln inhalation aerosol Inhale 2 Puffs every four hours as needed for Shortness of Breath. 1 Each 3    Everolimus 5 MG Tab Take 5mg orally once daily 30 Tablet 3    ondansetron (ZOFRAN ODT) 8 MG TABLET DISPERSIBLE Take 1 Tablet by mouth every 8 hours as needed for Nausea for up to 60 doses. 60 Tablet 1    gabapentin (NEURONTIN) 300 MG Cap Take 1 Capsule by mouth every evening. 90 Capsule 3    Ginger 500 MG Cap by Other route.      acetaminophen (TYLENOL) 500 MG Tab Take 600 mg by mouth.      Black Elderberry,Berry-Flower, 575 MG Cap Take  by mouth.      Multiple Vitamins-Minerals (THERA-M) Tab Take 1 Tablet by mouth every day.      diazePAM (VALIUM) 5 MG Tab Take 5 mg by mouth every 6 hours as needed for Anxiety.      lidocaine (XYLOCAINE) 4 % Solution Apply  topically as needed.      therapeutic multivitamin-minerals (THERAGRAN-M) Tab Take 1 Tablet by mouth every day.       No current facility-administered medications on file prior to visit.       Review  "of Systems   Constitutional:  Positive for malaise/fatigue. Negative for chills, fever and weight loss.   Respiratory:  Positive for cough (mild) and shortness of breath.    Cardiovascular:  Negative for chest pain and palpitations.   Gastrointestinal:  Negative for constipation, diarrhea, heartburn, nausea and vomiting.   Genitourinary:  Positive for frequency (r/t Lasix). Negative for dysuria.   Skin:  Negative for itching and rash.   Neurological:  Negative for dizziness and headaches.            Objective     BP (!) 149/87   Pulse 81   Temp 36.2 °C (97.2 °F) (Temporal)   Resp 19   Ht 1.549 m (5' 1\")   Wt 109 kg (239 lb 8.5 oz)   SpO2 94%   BMI 45.26 kg/m²      Physical Exam  Vitals reviewed.   Constitutional:       General: She is not in acute distress.     Appearance: Normal appearance. She is not diaphoretic.   HENT:      Head: Normocephalic and atraumatic.   Cardiovascular:      Rate and Rhythm: Normal rate and regular rhythm.      Heart sounds: Normal heart sounds. No murmur heard.    No friction rub. No gallop.   Pulmonary:      Effort: Pulmonary effort is normal. No respiratory distress.      Breath sounds: Normal breath sounds. No wheezing.   Abdominal:      General: Bowel sounds are normal. There is no distension.      Palpations: Abdomen is soft.      Tenderness: There is no abdominal tenderness.   Musculoskeletal:         General: Swelling (bilateral upper and lower extremities) present. No tenderness. Normal range of motion.   Skin:     General: Skin is warm and dry.   Neurological:      Mental Status: She is alert and oriented to person, place, and time.   Psychiatric:         Mood and Affect: Mood normal.         Behavior: Behavior normal.           Latest Reference Range & Units 03/08/23 16:00   WBC 4.8 - 10.8 K/uL 3.3 (L)   RBC 4.20 - 5.40 M/uL 2.50 (L)   Hemoglobin 12.0 - 16.0 g/dL 8.3 (L)   Hematocrit 37.0 - 47.0 % 25.7 (L)   MCV 81.4 - 97.8 fL 102.8 (H)   MCH 27.0 - 33.0 pg 33.2 (H) "   MCHC 33.6 - 35.0 g/dL 32.3 (L)   RDW 35.9 - 50.0 fL 58.9 (H)   Platelet Count 164 - 446 K/uL 127 (L)   MPV 9.0 - 12.9 fL 10.1   Neutrophils-Polys 44.00 - 72.00 % 55.70   Neutrophils (Absolute) 2.00 - 7.15 K/uL 1.86 (L)   Lymphocytes 22.00 - 41.00 % 33.80   Lymphs (Absolute) 1.00 - 4.80 K/uL 1.13   Monocytes 0.00 - 13.40 % 5.70   Monos (Absolute) 0.00 - 0.85 K/uL 0.19   Eosinophils 0.00 - 6.90 % 1.80   Eos (Absolute) 0.00 - 0.51 K/uL 0.06   Basophils 0.00 - 1.80 % 0.60   Baso (Absolute) 0.00 - 0.12 K/uL 0.02   Immature Granulocytes 0.00 - 0.90 % 2.40 (H)   Immature Granulocytes (abs) 0.00 - 0.11 K/uL 0.08   Nucleated RBC /100 WBC 0.60   NRBC (Absolute) K/uL 0.02                Assessment & Plan       1. Malignant neoplasm of breast metastatic to bone (HCC)  CBC WITH DIFFERENTIAL    Comp Metabolic Panel    diazePAM (VALIUM) 5 MG Tab      2. Anxiety  diazePAM (VALIUM) 5 MG Tab      3. Encounter for long-term current use of high risk medication                Impression:  1.  Metastatic ER positive breast cancer with extensive bone metastases, previously on letrozole and Ibrance with new onset of multiple subcutaneous and cutaneous nodules, increasing symptoms of fatigue and nausea, increasing CA 27-29.  All consistent with progression of disease, confirmed by extensive sclerotic metastases on CT and bone scan 1/23. Started exemestane and everolimus 2/23 and discontinued d/t poor tolerance.   2.  Left arm lymphedema. Stable  3.  History of left venous thrombosis in the axillary area. Negative ultrasound now off anticoagulation  4.  Situational anxiety. She will use Valium at her discretion. Refill provided today.   5.  Bronchitis recurrent and worse with CT findings of mucous plugging and increasing symptomatology  6.  Nausea secondary to Ibrance.  Resolved  7.  Poor tolerance of fulvestrant with hives necessitating discontinuation after 2 doses  8.  Acute development of increased cough shortness of breath and green  sputum.  Started on antibiotics.  Rule out everolimus induced interstitial pneumonitis versus exacerbation of bronchitis versus bacterial pneumonitis. Symptoms improved with discontinuation of Everolimus and start of Lasix.  9. Anasarca - will continue Lasix 20 mg PO daily x1 more week        Plan:  Discussed with Dr. Arredondo and will continue with the Lasix at 20 mg p.o. daily for 1 more week.  She is due to have CBC, CMP labs completed this week and will evaluate her potassium levels as well.  She will see Dr. Arredondo next week and have reevaluation of overall edema and clinical symptoms.    Patient also discontinued the everolimus as well as the exemestane as she believes her symptoms were all related to this medication.  Discussed with patient that she is to continue to stay off the medication and discussed with Dr. Arredondo at next visit on 3/28/2023 to discuss treatment options.      Please note that this dictation was created using voice recognition software. I have made every reasonable attempt to correct obvious errors, but I expect that there are errors of grammar and possibly content that I did not discover before finalizing the note.

## 2023-03-28 PROBLEM — D64.89 OTHER SPECIFIED ANEMIAS: Status: ACTIVE | Noted: 2023-01-01

## 2023-03-28 NOTE — PROGRESS NOTES
Subjective   Medical oncology follow-up visit: 3/28/2023  Rodrick Howard is a 72 y.o. female who presents with metastatic breast cancer        HPI    Referring Physician: Taniya Heck MD  Primary Care:  None     Diagnosis: Metastatic ER positive breast cancer with extensive bone metastases     Chief Complaint: Patient seen today for evaluation for caner therapy with Everolimus and exemestane for breast cancer, for continued monitoring of symptoms and side effects of cancer treatments.      Oncology history of presenting illness:  Patient with a history of metastatic ER positive breast cancer who recently moved to [place] to live with her daughter and establish care here.  Her history dates back to 2018 when she noticed nipple inversion but did not seek out medical care until October 2019  When she found a large breast mass in the left breast.  She did undergo biopsy which showed invasive ductal carcinoma with lobular features ER/MI positive, HER2 negative.  She did undergo a left mastectomy which showed an 8 cm lesion with 13/13 lymph nodes positive.  She had a CT scan postsurgery which showed diffuse bony disease and the patient declined biopsy at that time.  She was started on letrozole which she continued on until January 2021 and found to have progressive disease with increasing tumor markers.  She was then started on Faslodex and tolerated this poorly and quickly discontinue this medication.  She then went on tamoxifen for 1 year from February 2021 to February 2022 where patient had noticed further progression of disease in the bone and a rising CA 27.29.  She then was restarted on letrozole and Ibrance in February 2022 and had been doing reasonably well with dose of 100 mg of Ibrance 2 out of 4 weeks.  In preparation for her moved to [place] she had recent imaging including a bone scan on 9/7/2022 which showed stable osseous metastatic disease in the ribs, sternum, calvarium, femurs and proximal humeri,  and thoracic spine.  She had a CT scan of the chest, abdomen and pelvis at that time which showed a 3 mm nodule in the posterior right upper lobe and right axillary adenopathy which was smaller.  Diffuse sclerotic bony mets have not changed.  Her CA 27.29 has been rising but apparently the last 1 taken in September 2022 showed a drop of 400 points from around 2000.  She did have a history of left axillary vein clot and is on Eliquis for this.  She also has left arm lymphedema.  She does not take any narcotic pain medication and tries to use as little medication as possible.  She does have severe anxiety and distress over the move and takes an occasional Valium for this. A Yuanfen~Flowâ„¢ comprehensive profile for genomics was done earlier this year and showed ER positivity and a PI 3 kinase mutation potentially actionable by [facility].     Interval histories:  Interval history 10/26/2022: She had some difficulty getting her Ibrance to her office but this was finally solved and she is back on schedule with 3-week on 1 week off.  In the past she has occasionally had to delay an extra week for fatigue and nausea but so far she is doing well with this cycle.  Lab tests with CBC are stable with her CA 27-29 2300 essentially stable.  We repeated the ultrasound on the left arm which showed patent veins so Eliquis was discontinued.  She went to lymphedema treatment and is now using a sleeve and compression massages to help her lymphedema.    Interval history 12/2/2022: She has been tolerating her Ibrance and letrozole okay except for increasing fatigue mainly in the afternoon.  She remains independent of ADLs.  She has had for the past 2 weeks bronchial infection and a chronic cough with light gray sputum.  She has not had any fever, shortness of breath, sweats or hemoptysis.  He has some nasal congestion as well.  Lymphedema is stable.  Tumor markers are up about 5% within the range of variation.     Interval history 1/4/2023: She has  not done well over the past month.  She continues on Ibrance and letrozole but has had increasing fatigue.  She is also noted new nodules in her chest wall particularly in the left mastectomy scar but also above and below it which are itchy.  He has nausea also attributable to the Ibrance which ondansetron helps.  CA 27-29 increased to the 3400 range which represents a substantial worsening.  Her hemoglobin remained fairly stable at 10.1 but she feels weaker and has less exercise tolerance.  She also had another sinus infection but this has cleared.     Interval history 1/17/2023: She had a staging work-up which included CT of the chest abdomen pelvis demonstrating extensive diffuse predominantly sclerotic osseous metastases with a prominent lytic lesion in T3 in the manubrium.  No visceral involvement was noted.  Bone scan showed diffuse osseous metastatic disease without further characterization.  Overall her new subcutaneous nodules and the bony disease suggests progression without new organ involvement except for the skin.  The CT scan also bronchial wall thickening and mucous plugging in the right lower lobe.  She is more symptomatic from a lung perspective and gets short of breath with walking.  She is also coughing a lot.  I am going to send her to pulmonary for further evaluation and give her an albuterol inhaler in the interim.     Interval history 3/2/2023: She started exemestane and then everolimus about a month ago.  She has had substantial fatigue from both drugs but no mucositis.  She is doing a Decadron mouthwash relatively faithfully.  1 week ago she began developing increased cough and productive green sputum that was thick.  She had no fever.  She went to urgent care several days ago and was felt to have a potential left-sided pneumonia based on physical exam.  No x-ray was done.  She was started on Augmentin and antibiotic with some improvement in her sputum production.  She continues to cough a  lot but has used cough medication only intermittently.  She has used guaifenesin with some benefit.  Her O2 saturations have been good.  In the last 24 hours she has developed some diarrhea.  Labs from 2/28/2022 showed improved granulocyte count and were otherwise stable.  Her CA 27-29 is still pending but CEA was down slightly.  The nodules on the chest wall appear to have flattened out somewhat.     Interval history 03/08/23 (Jannie, APRN):  Patient continues to have bronchitis symptoms.  She is not coughing as much as she had previously.  She stated sputum previously was green however now it is clear.  She does continue to have significant shortness of breath even with just communicating.  Fatigue is quite severe.  She states that she does not have an appetite.  She has tested multiple times that she if she had COVID and has been negative.  She was told to take Mucinex DM but that is causing significant dryness.  She is also taking Zyrtec which she believes helps with postnasal drip.  She is using humidifier as well as a steam within the bathroom to help break up the mucus.  She is using her inhaler as directed.  When she arrived to the office her oxygen saturation was 83% but then after a while it did go up to 93%.  The cough is still quite severe but stated she was able to sleep at least 3 hours straight last night for the first time.  She did complete the antibiotics.  She had some diarrhea from the antibiotics but that is resolved.  She is continuing to take her everolimus and her cancer antigen levels continue to trend down.  Discussed this with the patient today.    Interval history 03/20/23 (Jannie, APRN):  Patient was seen by pulmonary last week on 3/13/2023 after request for revisit due to pulmonary issues.  Per Dr. Luis, pulmonologist he is concerned that patient was having fluid overload and he started her on Lasix 20 mg p.o. daily.  He is requesting patient have follow-up today to see how she is  doing with regards to her symptoms.    Per patient she stopped all of her breast cancer medications including everolimus and the exemestane last Monday, 3/13/2023 due to tolerability.  Was that day as well that she was started on the Lasix.  Since stopping the medication and starting the Lasix she has had significant improvement.  She has noticed the edema has improved but still noted in her upper and lower extremities.  She does note that she is breathing much better and she definitely has a better appetite.  She feels like she is moving around better as well at home.  She does state that some days she has increased urination another days or less.  She is limiting her water intake as well per Dr. Luis's recommendation.  She stated that she does not normally swell on the right side so she was extremely concerned and did present to the emergency department at [facility] within the last 2 weeks.  She stated they did an ultrasound to rule out a blood clot which was negative.  She has noticed that she does tend to get winded at times with activity and utilizes the albuterol as needed.  She did take last dose of her steroid last night.  She had experienced significant restless leg syndrome which she believes it is related to the steroid.  She had to take her Valium tablet in order to sleep because of this.    Patient is very concerned that her symptoms were caused by the chemotherapy pill, everolimus for her breast cancer.  She is not interested in taking this medication as her quality of life was quite poor.  She plans to discuss this with Dr. Arredondo at her next visit scheduled next week on 3/28/2023.    Interval history 3/28/2023: She stopped everolimus and exemestane few weeks ago and is feeling slowly better.  She does have severe fatigue and her hemoglobin continues to decrease as do her platelets with normal cessation of her white blood cell count.  She denies any cough or sputum production.  Her edema  is slightly better on Lasix but she is still swollen.  Appetite remains impaired.  Not feel that she can tolerate any antineoplastic therapy at the moment.  CBC shows her hemoglobin now 7.8.  She felt much better on the prednisone but when she stopped it she crashed and has decreased appetite and more fatigue.       Treatment history:  10/2019: Diagnosis of left breast cancer s/p surery with questionable diffuse bony disease, not confirmed  11/2019 - 01/2021: Letrozole   01/2021: Progression of disease - Faslodex - not tolerated  02/2021 - 02/2022: Tamoxifen  02/2022: Progression of disease - Letrozole and Ibrance 100 mg  09/2022: Established with Dr. Arredondo  01/2023: Progression of disease   02/2023: Started Exemestane and Everolimus  03/13/23: Patient stopped the Exemestane and Everolimus own d/t symptoms      Allergies   Allergen Reactions    Codeine Vomiting and Nausea    Hydrocodone Vomiting and Nausea    Morphine Unspecified     Patient Unsure     Oxycodone Vomiting and Nausea     Current Outpatient Medications on File Prior to Encounter   Medication Sig Dispense Refill    diazePAM (VALIUM) 5 MG Tab Take 1 Tablet by mouth every 6 hours as needed for Anxiety for up to 30 days. 30 Tablet 0    prochlorperazine (COMPAZINE) 10 MG Tab Take 1 Tablet by mouth every 6 hours as needed for Nausea/Vomiting. 30 Tablet 3    exemestane (AROMASIN) 25 MG tablet Take 1 tablet by mouth every day. 30 Tablet 5    dexamethasone (DECADRON) 0.5 MG/5ML solution Take 10 mL by mouth 4 times a day. 4000 mL 0    albuterol 108 (90 Base) MCG/ACT Aero Soln inhalation aerosol Inhale 2 Puffs every four hours as needed for Shortness of Breath. 1 Each 3    Everolimus 5 MG Tab Take 5mg orally once daily 30 Tablet 3    ondansetron (ZOFRAN ODT) 8 MG TABLET DISPERSIBLE Take 1 Tablet by mouth every 8 hours as needed for Nausea for up to 60 doses. 60 Tablet 1    gabapentin (NEURONTIN) 300 MG Cap Take 1 Capsule by mouth every evening. 90 Capsule  "3    Ginger 500 MG Cap by Other route.      acetaminophen (TYLENOL) 500 MG Tab Take 600 mg by mouth.      Black Elderberry,Berry-Flower, 575 MG Cap Take  by mouth.      Multiple Vitamins-Minerals (THERA-M) Tab Take 1 Tablet by mouth every day.      lidocaine (XYLOCAINE) 4 % Solution Apply  topically as needed.      therapeutic multivitamin-minerals (THERAGRAN-M) Tab Take 1 Tablet by mouth every day.       No current facility-administered medications on file prior to encounter.       Review of Systems   Constitutional:  Positive for malaise/fatigue. Negative for chills, fever and weight loss.   Respiratory:  Positive for cough (mild) and shortness of breath.    Cardiovascular:  Negative for chest pain and palpitations.   Gastrointestinal:  Negative for constipation, diarrhea, heartburn, nausea and vomiting.   Genitourinary:  Positive for frequency (r/t Lasix). Negative for dysuria.   Skin:  Negative for itching and rash.   Neurological:  Negative for dizziness and headaches.            Objective     BP (!) (P) 143/85 (BP Location: Right arm, Patient Position: Sitting, BP Cuff Size: Large adult)   Pulse (P) 89   Temp (P) 36.5 °C (97.7 °F) (Temporal)   Resp 18   Ht 1.549 m (5' 0.98\")   Wt 104 kg (228 lb 2.8 oz)   SpO2 (P) 91%   BMI 43.14 kg/m²      Physical Exam  Vitals reviewed.   Constitutional:       General: She is not in acute distress.     Appearance: Normal appearance. She is not diaphoretic.   HENT:      Head: Normocephalic and atraumatic.   Cardiovascular:      Rate and Rhythm: Normal rate and regular rhythm.      Heart sounds: Normal heart sounds. No murmur heard.    No friction rub. No gallop.   Pulmonary:      Effort: Pulmonary effort is normal. No respiratory distress.      Breath sounds: Normal breath sounds. No wheezing.   Abdominal:      General: Bowel sounds are normal. There is no distension.      Palpations: Abdomen is soft.      Tenderness: There is no abdominal tenderness.   Musculoskeletal: "         General: Swelling (bilateral upper and lower extremities) present. No tenderness. Normal range of motion.   Skin:     General: Skin is warm and dry.   Neurological:      Mental Status: She is alert and oriented to person, place, and time.   Psychiatric:         Mood and Affect: Mood normal.         Behavior: Behavior normal.           Latest Reference Range & Units 03/08/23 16:00   WBC 4.8 - 10.8 K/uL 3.3 (L)   RBC 4.20 - 5.40 M/uL 2.50 (L)   Hemoglobin 12.0 - 16.0 g/dL 8.3 (L)   Hematocrit 37.0 - 47.0 % 25.7 (L)   MCV 81.4 - 97.8 fL 102.8 (H)   MCH 27.0 - 33.0 pg 33.2 (H)   MCHC 33.6 - 35.0 g/dL 32.3 (L)   RDW 35.9 - 50.0 fL 58.9 (H)   Platelet Count 164 - 446 K/uL 127 (L)   MPV 9.0 - 12.9 fL 10.1   Neutrophils-Polys 44.00 - 72.00 % 55.70   Neutrophils (Absolute) 2.00 - 7.15 K/uL 1.86 (L)   Lymphocytes 22.00 - 41.00 % 33.80   Lymphs (Absolute) 1.00 - 4.80 K/uL 1.13   Monocytes 0.00 - 13.40 % 5.70   Monos (Absolute) 0.00 - 0.85 K/uL 0.19   Eosinophils 0.00 - 6.90 % 1.80   Eos (Absolute) 0.00 - 0.51 K/uL 0.06   Basophils 0.00 - 1.80 % 0.60   Baso (Absolute) 0.00 - 0.12 K/uL 0.02   Immature Granulocytes 0.00 - 0.90 % 2.40 (H)   Immature Granulocytes (abs) 0.00 - 0.11 K/uL 0.08   Nucleated RBC /100 WBC 0.60   NRBC (Absolute) K/uL 0.02                Assessment & Plan       1. Malignant neoplasm of breast metastatic to bone (HCC)                Impression:  1.  Metastatic ER positive breast cancer with extensive bone metastases, previously on letrozole and Ibrance with new onset of multiple subcutaneous and cutaneous nodules, increasing symptoms of fatigue and nausea, increasing CA 27-29.  All consistent with progression of disease, confirmed by extensive sclerotic metastases on CT and bone scan 1/23. Started exemestane and everolimus 2/23 and discontinued d/t poor tolerance.   2.  Left arm lymphedema. Stable  3.  History of left venous thrombosis in the axillary area. Negative ultrasound now off  anticoagulation  4.  Situational anxiety. She will use Valium at her discretion. Refill provided today.   5.  Bronchitis recurrent and worse with CT findings of mucous plugging and increasing symptomatology  6.  Nausea secondary to Ibrance.  Resolved  7.  Poor tolerance of fulvestrant with hives necessitating discontinuation after 2 doses  8.  Cough and shortness of breath markedly improved  9. Anasarca improved- will continue Lasix 20 mg PO daily  10.  Chemotherapy-induced anemia worse        Plan:    We will transfuse 1 unit of packed red blood cells.  I am starting her back on 20 mg of prednisone daily with slow taper over 30 days down to 5 mg.  I will see her back in approximately a month.  We will order patient palliative care to evaluate her as well.  She has a longstanding family trip planned in mid May and we will plan to rescan her after that trip and see if there is any systemic therapy she can tolerate.    Please note that this dictation was created using voice recognition software. I have made every reasonable attempt to correct obvious errors, but I expect that there are errors of grammar and possibly content that I did not discover before finalizing the note.

## 2023-03-29 PROBLEM — J96.00 ACUTE RESPIRATORY FAILURE (HCC): Status: ACTIVE | Noted: 2023-01-01

## 2023-03-29 PROBLEM — E87.6 HYPOKALEMIA: Status: ACTIVE | Noted: 2023-01-01

## 2023-03-29 PROBLEM — D61.818 PANCYTOPENIA (HCC): Status: ACTIVE | Noted: 2023-01-01

## 2023-03-29 NOTE — ED PROVIDER NOTES
ED Provider Note    CHIEF COMPLAINT  Chief Complaint   Patient presents with    Anemia     Was sent for transfusion      Shortness of Breath     Noticed her O2 level was low last night  did not come in or wake her daughter to come in        EXTERNAL RECORDS REVIEWED  Patient outpatient labs revealing hemoglobin 7.8, improved, 113    HPI/ROS  LIMITATION TO HISTORY   none  OUTSIDE HISTORIAN(S):  Patient's daughter    Rodrick Howard is a 72 y.o. female who presents with chief complaint of shortness of breath.  Patient has a history of metastatic breast cancer with known metastases to the bone and chronic anemia.  Patient has had progressively worsening fatigue and chronic shortness of breath which was thought to be secondary to her anemia which is worsened over the last few weeks.  Patient was scheduled for an outpatient transfusion however there appeared to be some miscommunication or an error in the order for this yesterday and therefore patient did not receive a blood transfusion.  This morning patient's daughter reports that her mother was significantly more short of breath than normal.  They checked a at home pulse ox and her oxygen levels were in the 70s.  Patient does not have home O2.  Patient reports significantly worsening shortness of breath on ambulation.  Patient does have a history of a upper extremity DVT of her left extremity, this was years ago and she is no longer anticoagulated.  Patient denies any associated chest pain but does report that this morning when she was significantly short of breath she had some chest pressure.    PAST MEDICAL HISTORY   has a past medical history of Cough, Shortness of breath, Sputum production, and Wheezing.    SURGICAL HISTORY  patient denies any surgical history    FAMILY HISTORY  No family history on file.    SOCIAL HISTORY  Social History     Tobacco Use    Smoking status: Never    Smokeless tobacco: Never   Vaping Use    Vaping Use: Former    Substances:  "THC    Devices: Pre-filled or refillable cartridge   Substance and Sexual Activity    Alcohol use: Not Currently    Drug use: Yes     Types: Marijuana    Sexual activity: Not on file       CURRENT MEDICATIONS  Home Medications       Reviewed by Lois Anderson R.N. (Registered Nurse) on 03/29/23 at 1053  Med List Status: Partial     Medication Last Dose Status   acetaminophen (TYLENOL) 500 MG Tab  Active   albuterol 108 (90 Base) MCG/ACT Aero Soln inhalation aerosol  Active   Black Elderberry,Berry-Flower, 575 MG Cap  Active   dexamethasone (DECADRON) 0.5 MG/5ML solution  Active   diazePAM (VALIUM) 5 MG Tab  Active   Everolimus 5 MG Tab  Active   exemestane (AROMASIN) 25 MG tablet  Active   gabapentin (NEURONTIN) 300 MG Cap  Active   Blaire 500 MG Cap  Active   lidocaine (XYLOCAINE) 4 % Solution  Active   Multiple Vitamins-Minerals (THERA-M) Tab  Active   ondansetron (ZOFRAN ODT) 8 MG TABLET DISPERSIBLE  Active   predniSONE (DELTASONE) 5 MG Tab  Active   prochlorperazine (COMPAZINE) 10 MG Tab  Active   therapeutic multivitamin-minerals (THERAGRAN-M) Tab  Active                    ALLERGIES  Allergies   Allergen Reactions    Codeine Vomiting and Nausea    Hydrocodone Vomiting and Nausea    Morphine Unspecified     Patient Unsure     Oxycodone Vomiting and Nausea       PHYSICAL EXAM  VITAL SIGNS: /72   Pulse 75   Temp 36.6 °C (97.8 °F) (Temporal)   Resp 18   Ht 1.549 m (5' 1\")   Wt 104 kg (229 lb 0.9 oz)   SpO2 94%   BMI 43.28 kg/m²    Physical Exam  Constitutional:       Appearance: She is well-developed.   HENT:      Head: Normocephalic and atraumatic.   Eyes:      Pupils: Pupils are equal, round, and reactive to light.   Cardiovascular:      Rate and Rhythm: Normal rate and regular rhythm.   Pulmonary:      Effort: Pulmonary effort is normal. No accessory muscle usage or respiratory distress.      Breath sounds: Normal breath sounds.      Comments: Satting at 99% on 3 L, normal work of " breathing  Abdominal:      General: Bowel sounds are normal.      Palpations: Abdomen is soft. There is no mass.      Tenderness: There is no abdominal tenderness.   Musculoskeletal:         General: Normal range of motion.   Skin:     General: Skin is warm.      Capillary Refill: Capillary refill takes less than 2 seconds.   Neurological:      General: No focal deficit present.      Mental Status: She is alert.   Psychiatric:         Mood and Affect: Mood normal. Mood is not anxious.         DIAGNOSTIC STUDIES / PROCEDURES  EKG  I have independently interpreted this EKG  EKG is normal sinus rhythm, normal axis normal intervals no ST changes consistent with acute regional ischemia    Results for orders placed or performed during the hospital encounter of 03/29/23   CBC WITH DIFFERENTIAL   Result Value Ref Range    WBC 5.2 4.8 - 10.8 K/uL    RBC 2.12 (L) 4.20 - 5.40 M/uL    Hemoglobin 6.9 (L) 12.0 - 16.0 g/dL    Hematocrit 21.8 (L) 37.0 - 47.0 %    .8 (H) 81.4 - 97.8 fL    MCH 32.5 27.0 - 33.0 pg    MCHC 31.7 (L) 33.6 - 35.0 g/dL    RDW 65.6 (H) 35.9 - 50.0 fL    Platelet Count 91 (L) 164 - 446 K/uL    MPV 9.8 9.0 - 12.9 fL    Neutrophils-Polys 74.00 (H) 44.00 - 72.00 %    Lymphocytes 16.00 (L) 22.00 - 41.00 %    Monocytes 4.00 0.00 - 13.40 %    Eosinophils 3.00 0.00 - 6.90 %    Basophils 0.00 0.00 - 1.80 %    Nucleated RBC 0.80 /100 WBC    Neutrophils (Absolute) 4.00 2.00 - 7.15 K/uL    Lymphs (Absolute) 0.83 (L) 1.00 - 4.80 K/uL    Monos (Absolute) 0.21 0.00 - 0.85 K/uL    Eos (Absolute) 0.16 0.00 - 0.51 K/uL    Baso (Absolute) 0.00 0.00 - 0.12 K/uL    NRBC (Absolute) 0.04 K/uL    Anisocytosis 1+     Macrocytosis 1+    CMP   Result Value Ref Range    Sodium 133 (L) 135 - 145 mmol/L    Potassium 3.5 (L) 3.6 - 5.5 mmol/L    Chloride 95 (L) 96 - 112 mmol/L    Co2 25 20 - 33 mmol/L    Anion Gap 13.0 7.0 - 16.0    Glucose 104 (H) 65 - 99 mg/dL    Bun 16 8 - 22 mg/dL    Creatinine 0.83 0.50 - 1.40 mg/dL    Calcium  9.2 8.4 - 10.2 mg/dL    AST(SGOT) 38 12 - 45 U/L    ALT(SGPT) 27 2 - 50 U/L    Alkaline Phosphatase 97 30 - 99 U/L    Total Bilirubin 1.0 0.1 - 1.5 mg/dL    Albumin 3.4 3.2 - 4.9 g/dL    Total Protein 5.9 (L) 6.0 - 8.2 g/dL    Globulin 2.5 1.9 - 3.5 g/dL    A-G Ratio 1.4 g/dL   TROPONIN   Result Value Ref Range    Troponin T 21 (H) 6 - 19 ng/L   proBrain Natriuretic Peptide, NT   Result Value Ref Range    NT-proBNP 389 (H) 0 - 125 pg/mL   CORRECTED CALCIUM   Result Value Ref Range    Correct Calcium 9.7 8.5 - 10.5 mg/dL   ESTIMATED GFR   Result Value Ref Range    GFR (CKD-EPI) 75 >60 mL/min/1.73 m 2   MORPHOLOGY   Result Value Ref Range    RBC Morphology Present     Polychromia 1+     Ovalocytes 1+     Tear Drop Cells 1+    DIFFERENTIAL MANUAL   Result Value Ref Range    Bands-Stabs 3.00 0.00 - 10.00 %    Manual Diff Status PERFORMED    PLATELET ESTIMATE   Result Value Ref Range    Plt Estimation Decreased    COD (Adult) - Type and Crossmatch only order if transfusing RBC'S   Result Value Ref Range    ABO Grouping Only A     Rh Grouping Only POS     Antibody Screen-Cod NEG     Component R       R99                 Red Cells, LR       [id number]   issued       23   15:01      Product Type R99     Dispense Status issued     Unit Number (Barcoded) [id number]     Product Code (Barcoded) G4925S58     Blood Type (Barcoded) 6200    ABO Rh Confirm   Result Value Ref Range    ABO Rh Confirm A POS    EKG   Result Value Ref Range    Report       [facility] Emergency Dept.    Test Date:  2023  Pt Name:    OMER GUADARRAMA              Department: [facility]  MRN:        [id number]                      Room:       -ROOM 1  Gender:     Female                       Technician: [id number]  :        [id number]                   Requested By:SAMEER ALEJANDRE  Order #:    [id number]                    Marbin MD:    Measurements  Intervals                                Axis  Rate:       71                            P:          -7  UT:         169                          QRS:        -7  QRSD:       107                          T:          13  QT:         415  QTc:        451    Interpretive Statements  Sinus rhythm  Abnormal R-wave progression, early transition  Baseline wander in lead(s) II,III,aVF  No previous ECG available for comparison             RADIOLOGY  I have independently interpreted the diagnostic imaging associated with this visit and am waiting the final reading from the radiologist.   My preliminary interpretation is as follows: no obvious PE  Radiologist interpretation:   CT-CTA CHEST PULMONARY ARTERY W/ RECONS   Final Result      1.  No evidence of pulmonary embolus.      2.  Small bilateral pleural effusions, right greater than left with right lung base atelectasis.      3.  Again seen diffuse metastatic disease involving all visualized osseous structures with a mixed sclerotic and lytic lesions.      4.  Patchy bilateral groundglass opacities which may represent infectious process.      DX-CHEST-PORTABLE (1 VIEW)   Final Result         1. No acute cardiopulmonary abnormalities are identified.      EC-ECHOCARDIOGRAM COMPLETE W/ CONT    (Results Pending)         COURSE & MEDICAL DECISION MAKING      INITIAL ASSESSMENT, COURSE AND PLAN  Care Narrative: Patient here with shortness of breath and hypoxia in the setting of a history of DVT, and active cancer.  Certainly patient is at very high risk for possible pulmonary embolus and therefore we will check CTA.  Patient with worsening anemia on review of patient's outpatient labs however she has not crossed the 7.0 threshold for transfusion, her shortness of breath is likely exacerbated by her anemia but this should not be causing significant hypoxia.    Patient basic labs reveal relatively mild anemia at 6.9.  Will transfuse.  Patient without any complaints of active bleeding, will defer platelet transfusion.  Patient CTA fails to reveal any evidence  of pulmonary edema, possible findings consistent with ongoing pneumonitis.  Patient remains hypoxic of unclear etiology.  Patient's troponin is mildly elevated, hospitalist will trend.  Patient will be admitted for ongoing care and evaluation.  I discussed this with both patient and her daughter.    ADDITIONAL PROBLEM LIST  COPD, asthma, metastatic lung cancer, history of DVT  DISPOSITION AND DISCUSSIONS  I have discussed management of the patient with the following physicians and ALINE's: Hospitalist      Barriers to care at this time, including but not limited to: none      FINAL DIAGNOSIS  Hypoxic respiratory failure of unclear etiology, severe anemia, anemia of chronic disease

## 2023-03-29 NOTE — ED NOTES
Results noted by erp-to bedside to discuss same. Pt allowed sips of water by this rn. Transfusion prbcs initiated.

## 2023-03-29 NOTE — ED TRIAGE NOTES
"Pt comes in w/ daughter  was sent here for blood transfusion  is now being treated to CA of bones  had L breast CA that has now spread to bones   per pt she noticed that last night she was having SOB and took her pulse ox read  it was in the \"70's\" per pt  it goes like that per pt  noted triage O2 w/ activity 86%  placed on 3L NC and now 96%   per daughter pt is no completely lucid  \"She's a bit off\" per daughter   pt appeared lucid and answering questions appropriately with staff   "

## 2023-03-29 NOTE — TELEPHONE ENCOUNTER
Patients daughter called stating that her mom cannot get out of bed and is weak. She stated that last night her oxygen dropped to 78 and she felt as if she had elephants on her chest. She stated that they were able to get it up to the 80's. She stated that again this morning her oxygen dropped but they were able to get it up to 92, however patient still could not get out of bed and is feeling week. She stated her frustration with infusion as the patient is scheduled for a type and screen today but was told she could not get it one for at least 3-4 days, she stated that her mother would not make it that long.     Symptoms were given to Yahaira and it was agreed that patient needs to go to ER urgently to be evaluated. Our office recommended if the patient could not ambulate that she should call 911, the patients daughter stated that she will refuse to go by ambulance so she will take her to [facility]. The patients daughter will call back with any changes.

## 2023-03-29 NOTE — ED NOTES
Med rec updated and complete, per pt   Allergies reviewed, per pt  Interviewed pt with daughter at bedside with permission from pt.  Pt reports that she stopped her EVEROLIMUS 5MG and EXEMESTANE 25MG about 3 weeks ago, because she feel like it was not working.  Pt started using DEXAMETHASONE 0.5MG/5ML oral solution on 2/9/2023 for 30 day course, pt reports that she finished the course.  Pt reports she has not started taking her PREDNISONE dose, because the pharmacy was not open yet.

## 2023-03-29 NOTE — ED NOTES
Pt reports they woke up around 0200 when they felt heaviness in their chest. Pt reported they felt like they needed to cough and once they coughed up some sputum the heaviness subsided.

## 2023-03-29 NOTE — H&P
Hospital Medicine History & Physical Note    Date of Service  3/29/2023    Primary Care Physician  Pcp Pt States None    Consultants  None     Code Status  Full Code    Chief Complaint  Chief Complaint   Patient presents with    Anemia     Was sent for transfusion      Shortness of Breath     Noticed her O2 level was low last night  did not come in or wake her daughter to come in        History of Presenting Illness    72-year-old female with history of metastatic breast cancer on immunotherapy also history of DVT who presented 3/29 with weakness and shortness of breath.  Patient has had coughing with sputum and some worsening shortness of breath and generalized weakness.  Also patient noticed her saturation at home around 70%.  Denied any fever or chills, denied any diarrhea or urinary symptoms.  Recently patient was seen by PCP and start with antibiotics and prednisone for 7 days and her symptoms improved however later got worse, on 3/28 patient was seen by her oncologist and recommended prednisone 20 mg daily with long tapering and holding immunotherapy due to severe anemia.  On admission patient was found to have hemoglobin 6.9 with potassium 3.5.  Patient needed 2 L nasal cannula to keep her saturation above 90%.  CTA for lung did not show PE.  An EKG showed sinus rhythm. One unit of red blood cell was transfused.    I discussed the plan of care with patient, family, bedside RN, and ED physician .    Review of Systems  Review of Systems   Constitutional:  Positive for malaise/fatigue and weight loss. Negative for chills and fever.   HENT:  Negative for ear pain, hearing loss and tinnitus.    Eyes:  Negative for blurred vision, double vision and photophobia.   Respiratory:  Positive for cough and shortness of breath. Negative for hemoptysis.    Cardiovascular:  Positive for leg swelling. Negative for chest pain, palpitations, orthopnea and claudication.   Gastrointestinal:  Negative for abdominal pain,  constipation, diarrhea, nausea and vomiting.   Genitourinary:  Negative for dysuria, frequency and urgency.   Musculoskeletal:  Negative for myalgias and neck pain.   Skin:  Negative for rash.   Neurological:  Negative for dizziness, speech change and weakness.     Past Medical History  Breast cancer    Surgical History  Surgery cancer surgery    Family History  Family history reviewed with patient. There is no family history that is pertinent to the chief complaint.     Social History   reports that she has never smoked. She has never used smokeless tobacco. She reports that she does not currently use alcohol. She reports current drug use. Drug: Marijuana.    Allergies  Allergies   Allergen Reactions    Codeine Vomiting and Nausea    Hydrocodone Vomiting and Nausea    Morphine Unspecified     Pt reports that she just does not want it    Oxycodone Vomiting and Nausea       Medications  Prior to Admission Medications   Prescriptions Last Dose Informant Patient Reported? Taking?   ELDERBERRY PO >1 week at Unknown Patient Yes Yes   Sig: Take 1 Tablet by mouth see administration instructions. Pt takes sporidially (OTC gummy)   Everolimus 5 MG Tab > 3 weeks at STOPPED Patient No No   Sig: Take 5mg orally once daily   Patient taking differently: Take 5 mg by mouth every day. Take 5mg orally once daily   Multiple Vitamins-Minerals (MULTI ADULT GUMMIES PO) 3/28/2023 at 1200 Patient Yes Yes   Sig: Take 2 Tablets by mouth every day.   acetaminophen (TYLENOL) 650 MG CR tablet 3/28/2023 at 0700 Patient Yes Yes   Sig: Take 650 mg by mouth every day.   albuterol 108 (90 Base) MCG/ACT Aero Soln inhalation aerosol 3/29/2023 at 0200 Patient No No   Sig: Inhale 2 Puffs every four hours as needed for Shortness of Breath.   amoxicillin-clavulanate (AUGMENTIN) 875-125 MG Tab 3/6/2023 at FINISHED Patient Yes Yes   Sig: Take 1 Tablet by mouth 2 times a day. Pt started on 2/28/2023 for 7 day course   diazePAM (VALIUM) 5 MG Tab > 1 week at  Unknown Patient No No   Sig: Take 1 Tablet by mouth every 6 hours as needed for Anxiety for up to 30 days.   doxycycline (VIBRAMYCIN) 100 MG Tab 3/6/2023 at FINISHED Patient Yes Yes   Sig: Take 100 mg by mouth 2 times a day. Pt started on 2/28/2023 for 7 day course   exemestane (AROMASIN) 25 MG tablet > 3 weeks at STOPPED Patient No No   Sig: Take 1 tablet by mouth every day.   furosemide (LASIX) 20 MG Tab 3/19/2023 at FINISHED Patient Yes Yes   Sig: Take 20 mg by mouth 2 times a day. Pt started on 3/13/2023 for 7 day course   gabapentin (NEURONTIN) 300 MG Cap 3/28/2023 at 1900 Patient No No   Sig: Take 1 Capsule by mouth every evening.   ondansetron (ZOFRAN ODT) 8 MG TABLET DISPERSIBLE 3/28/2023 at 1700 Patient No No   Sig: Take 1 Tablet by mouth every 8 hours as needed for Nausea for up to 60 doses.   predniSONE (DELTASONE) 20 MG Tab 3/19/2023 at FINISHED Patient Yes Yes   Sig: Take 40 mg by mouth every day. Pt started on 3/13/2023 for 7 day course   predniSONE (DELTASONE) 5 MG Tab NEW RX Patient No No   Sig: Take two tablets twice a day in the am and pm for 10 days, then two tablets in am and one tablet in pm for 10 days, then one tablet in am and one tablet in pm for 10 days, then one tablet daily   prochlorperazine (COMPAZINE) 10 MG Tab > 1 month at Unknown Patient No No   Sig: Take 1 Tablet by mouth every 6 hours as needed for Nausea/Vomiting.      Facility-Administered Medications: None       Physical Exam  Temp:  [36.1 °C (97 °F)-36.6 °C (97.8 °F)] 36.3 °C (97.3 °F)  Pulse:  [67-83] 71  Resp:  [18-20] 18  BP: (102-132)/(57-76) 129/76  SpO2:  [94 %-100 %] 97 %  Blood Pressure : 102/65   Temperature: 36.1 °C (97 °F)   Pulse: 67   Respiration: 20   Pulse Oximetry: 97 % (on 2L n/c)       Physical Exam  Constitutional:       Appearance: She is ill-appearing.   HENT:      Head: Normocephalic and atraumatic.   Cardiovascular:      Rate and Rhythm: Normal rate.      Heart sounds: No murmur heard.  Pulmonary:       Effort: No respiratory distress.      Breath sounds: No wheezing or rales.   Abdominal:      General: There is no distension.      Tenderness: There is no abdominal tenderness. There is no guarding.   Musculoskeletal:      Right lower leg: Edema present.      Left lower leg: Edema present.   Skin:     Coloration: Skin is pale.      Findings: No bruising, lesion or rash.   Neurological:      General: No focal deficit present.      Mental Status: She is oriented to person, place, and time. Mental status is at baseline.      Cranial Nerves: No cranial nerve deficit.      Sensory: No sensory deficit.      Motor: No weakness.       Laboratory:  Recent Labs     03/29/23  1212   WBC 5.2   RBC 2.12*   HEMOGLOBIN 6.9*   HEMATOCRIT 21.8*   .8*   MCH 32.5   MCHC 31.7*   RDW 65.6*   PLATELETCT 91*   MPV 9.8     Recent Labs     03/29/23  1207   SODIUM 133*   POTASSIUM 3.5*   CHLORIDE 95*   CO2 25   GLUCOSE 104*   BUN 16   CREATININE 0.83   CALCIUM 9.2     Recent Labs     03/29/23  1207   ALTSGPT 27   ASTSGOT 38   ALKPHOSPHAT 97   TBILIRUBIN 1.0   GLUCOSE 104*         Recent Labs     03/29/23  1207   NTPROBNP 389*         Recent Labs     03/29/23  1207   TROPONINT 21*       Imaging:  CT-CTA CHEST PULMONARY ARTERY W/ RECONS   Final Result      1.  No evidence of pulmonary embolus.      2.  Small bilateral pleural effusions, right greater than left with right lung base atelectasis.      3.  Again seen diffuse metastatic disease involving all visualized osseous structures with a mixed sclerotic and lytic lesions.      4.  Patchy bilateral groundglass opacities which may represent infectious process.      DX-CHEST-PORTABLE (1 VIEW)   Final Result         1. No acute cardiopulmonary abnormalities are identified.      EC-ECHOCARDIOGRAM COMPLETE W/ CONT    (Results Pending)       X-Ray:  I have personally reviewed the images and compared with prior images.  EKG:  I have personally reviewed the images and compared with prior  images.    Assessment/Plan:  Justification for Admission Status  I anticipate this patient will require at least two midnights for appropriate medical management, necessitating inpatient admission because severe anemia and needs to have blood transfusion also acute respiratory failure    Patient will need a Telemetry bed on CARDIOLOGY service .  The need is secondary to acute respiratory failure.    * Acute respiratory failure (HCC)- (present on admission)  Assessment & Plan  Her saturation was 70% at home with generalized weakness and chronic coughing  CTA did not show PE and no signs of pneumonia  At this time we will hold antibiotics  Likely related to anemia and generalized weakness  Weaning her from oxygen  Prednisone 20 mg daily  Continue Lasix orally 20 mg daily and check echo to assess EF.    Pancytopenia (HCC)  Assessment & Plan  Due to chemotherapy which was held by oncologist  Low platelets 91 and hemoglobin 6.9  Blood transfusion and labs daily    Anemia due to medication  Assessment & Plan  Due to chemotherapy, patient denied any bleeding  Low platelets and likely bone marrow failure  Transfusion keep hemoglobin above 7.  Patient received 1 unit ED  Hemoglobin every 8 hours.    Hypokalemia  Assessment & Plan  3.4 on admission  Replace orally  Labs daily    Dyspnea on exertion- (present on admission)  Assessment & Plan  With coughing  We will order echo to rule out heart failure  Continue Lasix 20 mg daily orally    Lymphedema- (present on admission)  Assessment & Plan  Chronic on the left arm with DVT on the left arm  Patient is not on anticoagulation    Chronic deep venous thrombosis of left axillary vein (HCC)- (present on admission)  Assessment & Plan  She is out of anticoagulation  Lovenox for DVT prophylaxis, patient is on high risk for DVT.    Malignant neoplasm of breast metastatic to bone (HCC)- (present on admission)  Assessment & Plan  Follow-up with oncologist stopped all immunotherapy and  chemotherapy  Palliative consult.        VTE prophylaxis: SCDs/TEDs and enoxaparin ppx

## 2023-03-29 NOTE — ED NOTES
Pt rounding upon return from rad. Vs as charted, call light in reach, oxygen at 2L n/c continues with dtr at bedside

## 2023-03-29 NOTE — ED NOTES
"Pts daughter stopped this RN in mendoza to report the pt \"is not great with following her medical plan\".  Pt had reportedly experienced SOB and chest pain last pm when home 02 sensor showed oxygen sat in the 70s.  Pt is on 3L NC at this time sating at 94%.  Daughter stated they were concerned about a previous visit to [facility] ER where they started an IV without thoroughly explaining the procedure and it upset the pt.  "

## 2023-03-29 NOTE — ED NOTES
Pt rounding-aware  of pending transfusion prbcs. Consent signed for same with ns initiated. Dtr at bedside, await completion of cod

## 2023-03-30 NOTE — DIETARY
"Nutrition services: Day 1 of admit.  Rodrick Howard is a 72 y.o. female with admitting DX of Acute respiratory failure     Consult received for MST of 3 on nutrition screen due to report of 14-23 lb wt loss x 6 months and poor PO PTA. Pt also has a high BMI      Assessment:  Height: 154.9 cm (5' 1\")  Weight: 109 kg (240 lb 8.4 oz)  Body mass index is 45.45 kg/m²., BMI classification: class 3 (extreme) obesity  Diet/Intake: regular/ % x 1 and 25-50% x 2    Evaluation:   Hx of metastatic breast cancer on immunotherapy.   RD met w/ pt and dtr in  her room. Wt loss was r/t resolution of edema and pt was eating PTA. Pt does not appear malnourished.  Meds: Lasix, prednisone  Pt with BMI >40 (=Body mass index is 45.45 kg/m².), Class III obesity. Weight loss counseling not appropriate in acute care setting. Wt loss counseling may not be appropriate during cancer treatment.   Pt now w/ plan to D/C today.      Malnutrition Risk: ASPEN criteria not met    RD will follow PRN if D/C does not occur.     "

## 2023-03-30 NOTE — ASSESSMENT & PLAN NOTE
Due to chemotherapy which was held by oncologist  Low platelets 91 and hemoglobin 6.9  Blood transfusion and labs daily

## 2023-03-30 NOTE — CARE PLAN
The patient is Stable - Low risk of patient condition declining or worsening    Shift Goals  Clinical Goals: Monitor Oxygen levels  Patient Goals: Comfort    Progress made toward(s) clinical / shift goals:    Problem: Knowledge Deficit - Standard  Goal: Patient and family/care givers will demonstrate understanding of plan of care, disease process/condition, diagnostic tests and medications  3/29/2023 2135 by Thanh Nichole R.N.  Outcome: Progressing  Note: POC discussed with patient, patient verbalizes understanding, denies any other concerns at this time  3/29/2023 2134 by Thanh Nichole R.N.  Outcome: Progressing  3/29/2023 2130 by Thanh Nichole R.N.  Outcome: Progressing     Problem: Hemodynamics  Goal: Patient's hemodynamics, fluid balance and neurologic status will be stable or improve  Outcome: Progressing  Note: Post transfusion CBC ordered, hemoglobin stable for now.     Problem: Respiratory  Goal: Patient will achieve/maintain optimum respiratory ventilation and gas exchange  Outcome: Progressing       Patient is not progressing towards the following goals:

## 2023-03-30 NOTE — DISCHARGE PLANNING
Case Management Discharge Planning    Admission Date: 3/29/2023  GMLOS: 5.1  ALOS: 1    6-Clicks ADL Score: 24  6-Clicks Mobility Score: 24      Anticipated Discharge Dispo: Discharge Disposition: Discharged to home/self care (01)    DME Needed: Yes    DME Ordered: Yes    Action(s) Taken: Spoke to pt at bedside regarding home O2 choice. Received verbal approval for O2 from [facility]. Choice form completed and faxed to [facility].    RNCM sent referral to Lorna at 1541.    Escalations Completed: None    Medically Clear: Yes    Next Steps: f/u with Lorna regarding O2 delivery ETA.    Barriers to Discharge: DME

## 2023-03-30 NOTE — PROGRESS NOTES
4 Eyes Skin Assessment Completed by Min RN and eRggie RN.    Head WDL  Ears WDL  Nose WDL  Mouth WDL  Neck WDL  Breast/Chest left radical mastectomy  Shoulder Blades WDL  Spine rash/itchy  (R) Arm/Elbow/Hand WDL  (L) Arm/Elbow/Hand Edema/lymphadema r/t mastectomy  Abdomen WDL  Groin WDL  Scrotum/Coccyx/Buttocks WDL  (R) Leg Swelling  (L) Leg Swelling  (R) Heel/Foot/Toe WDL  (L) Heel/Foot/Toe WDL          Devices In Places Tele Box compression sleeve left arm      Interventions In Place Pillows and Pressure Redistribution Mattress    Possible Skin Injury No    Pictures Uploaded Into Epic N/A  Wound Consult Placed N/A  RN Wound Prevention Protocol Ordered No

## 2023-03-30 NOTE — PROGRESS NOTES
Telemetry Shift Summary     Rhythm: SR  Rate: 60s-90s  Measurements: 0.18/0.08/0.40  Ectopy (reported by Monitor Tech): rare PVC/PAC, rare Trigeminy      Normal Values  Rhythm: Sinus  HR:   Measurements: 0.12-0.20/0.06-0.10/0.30-0.52

## 2023-03-30 NOTE — ASSESSMENT & PLAN NOTE
Her saturation was 70% at home with generalized weakness and chronic coughing  CTA did not show PE and no signs of pneumonia  At this time we will hold antibiotics  Likely related to anemia and generalized weakness  Weaning her from oxygen  Prednisone 20 mg daily  Continue Lasix orally 20 mg daily and check echo to assess EF.

## 2023-03-30 NOTE — DISCHARGE SUMMARY
"Discharge Summary    CHIEF COMPLAINT ON ADMISSION  Chief Complaint   Patient presents with    Anemia     Was sent for transfusion      Shortness of Breath     Noticed her O2 level was low last night  did not come in or wake her daughter to come in        Reason for Admission  sent by MD; bo     Admission Date  3/29/2023    CODE STATUS  Full Code    HPI & HOSPITAL COURSE  Per notes, \"72-year-old female with history of metastatic breast cancer on immunotherapy also history of DVT who presented 3/29 with weakness and shortness of breath.  Patient has had coughing with sputum and some worsening shortness of breath and generalized weakness.  Also patient noticed her saturation at home around 70%.  Denied any fever or chills, denied any diarrhea or urinary symptoms.  Recently patient was seen by PCP and start with antibiotics and prednisone for 7 days and her symptoms improved however later got worse, on 3/28 patient was seen by her oncologist and recommended prednisone 20 mg daily with long tapering and holding immunotherapy due to severe anemia.  On admission patient was found to have hemoglobin 6.9 with potassium 3.5.  Patient needed 2 L nasal cannula to keep her saturation above 90%.  CTA for lung did not show PE.  An EKG showed sinus rhythm. One unit of red blood cell was transfused.\"    Patient was admitted for increased shortness of breath and acute hypoxemic respiratory failure.  On admission she was found to have a hemoglobin of 6.9 and was transfused 1 unit RBC.  Repeat hemoglobins were within normal limits.  Additionally on initial CT there was some patchy bilateral opacities, possibly representing an infectious process.  Also seen was diffuse metastatic disease all visualized osseous structures with mixed sclerotic and lytic lesions.  There were some small bilateral pleural effusions right greater than left with right lung base atelectasis.  Procalcitonin was not significantly elevated, no leukocytosis " or other signs of active infection at this time.  Patient does follow-up with pulmonology in the outpatient setting and has had increased shortness of breath and cough since she returned to [place] a [place] elevation and since she had COVID over a year ago.  I do suspect that patient has needed oxygen for some time, and likely has some component of chronic respiratory failure.  She was recently prescribed a long steroid taper by pulmonology, as mentioned above.  At this time I am recommending oxygen in the outpatient setting as patient is desaturating to 87% on room air when ambulating.  She should follow-up with pulmonology for further evaluation management of this.  At this time no further evaluation is needed in the hospital and patient is stable to be managed in the outpatient setting.    Therefore, she is discharged in good and stable condition to home with close outpatient follow-up.    The patient recovered much more quickly than anticipated on admission.    Discharge Date  3/30/2023    FOLLOW UP ITEMS POST DISCHARGE  FU with pulmonology     DISCHARGE DIAGNOSES  Principal Problem:    Acute respiratory failure (HCC) POA: Yes  Active Problems:    Malignant neoplasm of breast metastatic to bone (HCC) POA: Yes    Chronic deep venous thrombosis of left axillary vein (HCC) POA: Yes    Lymphedema POA: Yes    Dyspnea on exertion POA: Yes    Anemia due to medication POA: Unknown    Pancytopenia (HCC) POA: Unknown    Hypokalemia POA: Unknown  Resolved Problems:    * No resolved hospital problems. *      FOLLOW UP  Future Appointments   Date Time Provider Department Center   3/31/2023  3:00 PM [facility]   5/3/2023  3:00 PM Nathan Arredondo M.D. [facility] None     No follow-up provider specified.    MEDICATIONS ON DISCHARGE     Medication List        ASK your doctor about these medications        Instructions   acetaminophen 650 MG CR tablet  Commonly known as: TYLENOL  Ask about: Which  instructions should I use?   Take 650 mg by mouth every day.  Dose: 650 mg     albuterol 108 (90 Base) MCG/ACT Aers inhalation aerosol   Doctor's comments: Give albuterol that is patient or insurance preference please  Inhale 2 Puffs every four hours as needed for Shortness of Breath.  Dose: 2 Puff     amoxicillin-clavulanate 875-125 MG Tabs  Commonly known as: AUGMENTIN   Take 1 Tablet by mouth 2 times a day. Pt started on 2/28/2023 for 7 day course  Dose: 1 Tablet     diazePAM 5 MG Tabs  Commonly known as: VALIUM   Take 1 Tablet by mouth every 6 hours as needed for Anxiety for up to 30 days.  Dose: 5 mg     doxycycline 100 MG Tabs  Commonly known as: VIBRAMYCIN   Take 100 mg by mouth 2 times a day. Pt started on 2/28/2023 for 7 day course  Dose: 100 mg     ELDERBERRY PO   Take 1 Tablet by mouth see administration instructions. Pt takes sporidially (OTC gummy)  Dose: 1 Tablet     Everolimus 5 MG Tabs   Take 5mg orally once daily     exemestane 25 MG tablet  Commonly known as: AROMASIN   Take 1 tablet by mouth every day.  Dose: 25 mg     furosemide 20 MG Tabs  Commonly known as: LASIX   Take 20 mg by mouth 2 times a day. Pt started on 3/13/2023 for 7 day course  Dose: 20 mg     gabapentin 300 MG Caps  Commonly known as: NEURONTIN   Take 1 Capsule by mouth every evening.  Dose: 300 mg     MULTI ADULT GUMMIES PO  Ask about: Which instructions should I use?   Take 2 Tablets by mouth every day.  Dose: 2 Tablet     ondansetron 8 MG Tbdp  Commonly known as: ZOFRAN ODT   Take 1 Tablet by mouth every 8 hours as needed for Nausea for up to 60 doses.  Dose: 8 mg     * predniSONE 20 MG Tabs  Commonly known as: DELTASONE   Take 40 mg by mouth every day. Pt started on 3/13/2023 for 7 day course  Dose: 40 mg     * predniSONE 5 MG Tabs  Commonly known as: DELTASONE   Take two tablets twice a day in the am and pm for 10 days, then two tablets in am and one tablet in pm for 10 days, then one tablet in am and one tablet in pm for 10  days, then one tablet daily     prochlorperazine 10 MG Tabs  Commonly known as: COMPAZINE   Take 1 Tablet by mouth every 6 hours as needed for Nausea/Vomiting.  Dose: 10 mg           * This list has 2 medication(s) that are the same as other medications prescribed for you. Read the directions carefully, and ask your doctor or other care provider to review them with you.                  Allergies  Allergies   Allergen Reactions    Codeine Vomiting and Nausea    Hydrocodone Vomiting and Nausea    Morphine Unspecified     Pt reports that she just does not want it    Oxycodone Vomiting and Nausea       DIET  Orders Placed This Encounter   Procedures    Diet Order Diet: Regular     Standing Status:   Standing     Number of Occurrences:   1     Order Specific Question:   Diet:     Answer:   Regular [1]       ACTIVITY  As tolerated.  Weight bearing as tolerated    CONSULTATIONS  None    PROCEDURES  None    LABORATORY  Lab Results   Component Value Date    SODIUM 134 (L) 03/30/2023    POTASSIUM 3.9 03/30/2023    CHLORIDE 99 03/30/2023    CO2 23 03/30/2023    GLUCOSE 86 03/30/2023    BUN 16 03/30/2023    CREATININE 0.82 03/30/2023        Lab Results   Component Value Date    WBC 5.4 03/30/2023    HEMOGLOBIN 8.5 (L) 03/30/2023    HEMATOCRIT 26.3 (L) 03/30/2023    PLATELETCT 83 (L) 03/30/2023        Total time of the discharge process exceeds 45 minutes.

## 2023-03-30 NOTE — PROGRESS NOTES
Telemetry Shift Summary    Rhythm SR  HR Range 62-85  Ectopy R-PVC/PAC  Measurements 0.20/0.08/0.38        Normal Values  Rhythm SR  HR Range    Measurements 0.12-0.20 / 0.06-0.10  / 0.30-0.52

## 2023-03-30 NOTE — CARE PLAN
The patient is Stable - Low risk of patient condition declining or worsening    Shift Goals  Clinical Goals: Monitor O2, echo.  Patient Goals: Comfort, go home.    Progress made toward(s) clinical / shift goals:    Problem: Knowledge Deficit - Standard  Goal: Patient and family/care givers will demonstrate understanding of plan of care, disease process/condition, diagnostic tests and medications  Outcome: Progressing     Problem: Respiratory  Goal: Patient will achieve/maintain optimum respiratory ventilation and gas exchange  Outcome: Progressing  Note: Requiring 2L yesterday. Currently on 1L O2.        Patient is not progressing towards the following goals:

## 2023-03-30 NOTE — ASSESSMENT & PLAN NOTE
Due to chemotherapy, patient denied any bleeding  Low platelets and likely bone marrow failure  Transfusion keep hemoglobin above 7.  Patient received 1 unit ED  Hemoglobin every 8 hours.

## 2023-03-30 NOTE — FACE TO FACE
"Face to Face Note  -  Durable Medical Equipment    Gustavo Girard M.D. - NPI: [id number]  I certify that this patient is under my care and that they had a durable medical equipment(DME)face to face encounter by myself that meets the physician DME face-to-face encounter requirements with this patient on:    Date of encounter:   Patient:                    MRN:                       YOB: 2023  Rodrick Howard  [id number]  1950     The encounter with the patient was in whole, or in part, for the following medical condition, which is the primary reason for durable medical equipment:  Other - chronic respiratory failure    I certify that, based on my findings, the following durable medical equipment is medically necessary:    Oxygen   HOME O2 Saturation Measurements:(Values must be present for Home Oxygen orders)  Room air sat at rest: 88  Room air sat with amb: 87  With liters of O2: 1, O2 sat at rest with O2: 93  With Liters of O2: 2, O2 sat with amb with O2 : 93     If patient feels more short of breath, they can go up to 6 liters per minute and contact healthcare provider.    Supporting Symptoms: The patient requires supplemental oxygen, as the following interventions have been tried with limited or no improvement: \"Ambulation with oximetry and \"Incentive spirometry.    My Clinical findings support the need for the above equipment due to:  Hypoxia  "

## 2023-03-31 PROBLEM — J96.01 ACUTE RESPIRATORY FAILURE WITH HYPOXIA (HCC): Status: ACTIVE | Noted: 2023-01-01

## 2023-03-31 NOTE — PROGRESS NOTES
Discharge instructions given and discussed, signed copy in chart. Pt verbalized understanding and all questions answered. No new prescriptions. Pt discharged home with daughter in stable condition on 1L O2 via NC escorted by unit clerk. Personal belongings with patient. IV removed and tolerated well. Tele box removed, monitor tech notified.

## 2023-03-31 NOTE — PROCEDURES
Multi-Ox Readings  Multi Ox #1 Room air   O2 sat % at rest 93 (Pulse: 71)   O2 sat % on exertion 88 (Pulse: 101)   O2 sat average on exertion     Multi Ox #2 2 LPM   O2 sat % at rest 90   O2 sat % on exertion 88 (Pulse: 108)   O2 sat average on exertion       Oxygen Use 1   Oxygen Frequency With exertion and nocturnal   Duration of need     Is the patient mobile within the home?     CPAP Use?     BIPAP Use?     Servo Titration       Multi Ox #3 3 LPM   O2 sat % at rest 94 (Pulse: 86)   O2 sat % on exertion 92 (Pulse: 103)     Multi oximetry walk test on room air today in clinic demonstrated hypoxia with ambulation, requiring 3 LPM to maintain adequate saturations with ambulation.    __________  Charan Luis MD  Pulmonary and Critical Care Medicine  [facility]

## 2023-03-31 NOTE — PROGRESS NOTES
Pulmonary Clinic Note    Chief Complaint:  Chief Complaint   Patient presents with    Follow-Up     BOWER. Last seen 03/13/23     HPI:   Rodrick Howard is a very pleasant 72 y.o. female never smoker referred to the pulmonary clinic for chronic bronchitis and cough since moving to [place] from [facility].  She has a history of ER positive stage IV breast cancer s/p mastectomy 2019 and bone metastases currently on letrozole and Ibrance. She recently established care in the oncology clinic with Dr Arredondo.       After getting COVID in 6/2022 she developed a persistent cough that lingered and became progressively worse after moving to [place] in 9/2022.  Her cough is productive of clear sputum. No fevers. She endorses persistent post-nasal drip since moving to [place]. She has an air purifier in her bedroom but sleeps with the windows open at night. She was prescribed an albuterol inhaler which did not help her symptoms.      CT 1/8/2023 demonstrated extensive sclerotic osseous metastatic lesions as well as bronchial wall thickening and mucous plugging within the right lower lobe as well as linear atelectasis in the right upper and lower lobes and a small new right-sided pleural effusion.       She is a never smoker.    No prior PFTs.  No eosinophilia on recent CBC. No hx of environmental or seasonal allergies.   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     3/13: Her symptoms at initial consultation were suggestive of persistent bronchitis attributed to significant PND/aspiration.  She started using a bedroom humidifier as well as consistently taking Zyrtec with no improvement in her respiratory symptoms.  She has developed progressively worsening fatigue, malaise and dyspnea on exertion with a cough that was initially productive of green sputum and is now clear after course of Augmentin.  In addition she has also developed anasarca. She is drinking 2 to 2-1/2 L of water a day due to concerns of  developing dehydration.  Since her last visit she has gained 5 pounds.     Interval events since last visit two weeks ago:  She was started on diuretics for last visit and was followed up in the oncology clinic the following week.  She was doing better at that time and continued on diuretics.  She also discontinued everolimus as well as the exemestane she believes her symptoms were related to these medications.  Earlier this week she was seen by Dr. Arredondo and required hospitalization for symptomatic anemia and acute hypoxemic respiratory failure.  She was transfused 1 unit PRBC and started on supplemental oxygen.  CT demonstrated patchy bilateral opacities and increase in the right lower lobe infiltrate and right effusion that was present in 1/2023.  Procalcitonin was within normal limits, no leukocytosis or signs of active infection.      Today she reports continued use of her oxygen since discharge.  She is compliant with prednisone and Lasix.  She is short of breath with minimal ambulation.  She is quite fatigued and understandably emotionally exhausted from everything that she has been through recently.    Past Medical History:   Diagnosis Date    Cough     Shortness of breath     Sputum production     Wheezing        No past surgical history on file.    Social History     Socioeconomic History    Marital status:      Spouse name: Not on file    Number of children: Not on file    Years of education: Not on file    Highest education level: Not on file   Occupational History    Not on file   Tobacco Use    Smoking status: Never    Smokeless tobacco: Never   Vaping Use    Vaping Use: Former    Substances: THC    Devices: Pre-filled or refillable cartridge   Substance and Sexual Activity    Alcohol use: Not Currently    Drug use: Yes     Types: Marijuana    Sexual activity: Not on file   Other Topics Concern    Not on file   Social History Narrative    Not on file     Social Determinants of Health      Financial Resource Strain: Not on file   Food Insecurity: Not on file   Transportation Needs: Not on file   Physical Activity: Not on file   Stress: Not on file   Social Connections: Not on file   Intimate Partner Violence: Not on file   Housing Stability: Not on file        Family history reviewed and no significant conditions or diseases relevant to the chief complaint were identified    Current Outpatient Medications on File Prior to Visit   Medication Sig Dispense Refill    acetaminophen (TYLENOL) 650 MG CR tablet Take 650 mg by mouth every day.      ELDERBERRY PO Take 1 Tablet by mouth see administration instructions. Pt takes sporidially (OTC gummy)      Multiple Vitamins-Minerals (MULTI ADULT GUMMIES PO) Take 2 Tablets by mouth every day.      doxycycline (VIBRAMYCIN) 100 MG Tab Take 100 mg by mouth 2 times a day. Pt started on 2/28/2023 for 7 day course      predniSONE (DELTASONE) 5 MG Tab Take two tablets twice a day in the am and pm for 10 days, then two tablets in am and one tablet in pm for 10 days, then one tablet in am and one tablet in pm for 10 days, then one tablet daily 90 Tablet 1    diazePAM (VALIUM) 5 MG Tab Take 1 Tablet by mouth every 6 hours as needed for Anxiety for up to 30 days. 30 Tablet 0    prochlorperazine (COMPAZINE) 10 MG Tab Take 1 Tablet by mouth every 6 hours as needed for Nausea/Vomiting. 30 Tablet 3    exemestane (AROMASIN) 25 MG tablet Take 1 tablet by mouth every day. 30 Tablet 5    albuterol 108 (90 Base) MCG/ACT Aero Soln inhalation aerosol Inhale 2 Puffs every four hours as needed for Shortness of Breath. 1 Each 3    Everolimus 5 MG Tab Take 5mg orally once daily (Patient taking differently: Take 5 mg by mouth every day. Take 5mg orally once daily) 30 Tablet 3    ondansetron (ZOFRAN ODT) 8 MG TABLET DISPERSIBLE Take 1 Tablet by mouth every 8 hours as needed for Nausea for up to 60 doses. 60 Tablet 1    gabapentin (NEURONTIN) 300 MG Cap Take 1 Capsule by mouth every  "evening. 90 Capsule 3     No current facility-administered medications on file prior to visit.       Allergies: Codeine, Hydrocodone, Morphine, and Oxycodone      ROS:   Review of Systems   Constitutional:  Positive for malaise/fatigue.   HENT:  Positive for congestion.    Respiratory:  Positive for cough, shortness of breath and wheezing. Negative for hemoptysis.    Cardiovascular:  Positive for leg swelling.   All other systems reviewed and are negative.    Vitals:  /82 (BP Location: Right arm, Patient Position: Sitting, BP Cuff Size: Large adult)   Pulse 83   Ht 1.549 m (5' 1\")   Wt 104 kg (230 lb)   SpO2 93%     Physical Exam:  Physical Exam  Vitals and nursing note reviewed.   Constitutional:       General: She is not in acute distress.     Appearance: Normal appearance. She is well-developed. She is not diaphoretic.      Comments: Very pleasant  Accompanied by supportive daughter   Eyes:      General: No scleral icterus.        Right eye: No discharge.         Left eye: No discharge.      Conjunctiva/sclera: Conjunctivae normal.      Pupils: Pupils are equal, round, and reactive to light.   Neck:      Thyroid: No thyromegaly.      Vascular: No JVD.   Cardiovascular:      Rate and Rhythm: Normal rate and regular rhythm.      Heart sounds: Normal heart sounds. No murmur heard.    No gallop.   Pulmonary:      Effort: Pulmonary effort is normal. No respiratory distress.      Breath sounds: Wheezing present. No rales.   Abdominal:      General: There is no distension.      Palpations: Abdomen is soft.      Tenderness: There is no abdominal tenderness. There is no guarding.   Musculoskeletal:         General: No tenderness.      Right lower leg: Edema present.      Left lower leg: Edema present.   Lymphadenopathy:      Cervical: No cervical adenopathy.   Skin:     General: Skin is warm.      Capillary Refill: Capillary refill takes less than 2 seconds.      Findings: No erythema or rash.   Neurological:    "   Mental Status: She is alert and oriented to person, place, and time.      Cranial Nerves: No cranial nerve deficit.      Sensory: No sensory deficit.      Motor: No abnormal muscle tone.   Psychiatric:         Behavior: Behavior normal.     Laboratory Data:    PFTs as reviewed by me personally show:  See HPI    Imaging as reviewed by me personally show:    See HPI    Assessment/Plan:    Problem List Items Addressed This Visit       Pleural effusion on right     There has been interval increase in the RLL infiltrate and right effusion since January 2023.  There has been no symptomatic or radiographic improvement on antibiotics or steroids. This is concerning for potential pulmonary metastases with either a reactive or malignant pleural effusion.    -- Reviewed and compared CT imaging together during visit.  It is clear that the R effusion has increased in size since January and her symptoms have worsened as well.  It is a moderately sized effusion.  She may obtain some symptomatic relief with thoracentesis.  This would also provide diagnostic value if cytology confirms malignant cells.  Risk/benefits/alternatives discussed and she agreed for IR thoracentesis with anesthesia.  Orders placed.         Relevant Medications    furosemide (LASIX) 20 MG Tab    Other Relevant Orders    Multiple Oximetry    DME O2 New Set Up    US-THORACENTESIS PUNCTURE RIGHT    Abnormal CT scan, lung     Increased infiltrate in the right lower lobe on interval imaging from January to March 2023.  Refractory to antibiotics and steroids.  Concern for pulmonary metastases.  Plan of care as outlined above.         Acute respiratory failure with hypoxia (HCC)     Multifactorial due to likely some element of pulmonary edema and symptomatic anemia, unclear how much the pleural effusion is contributing to this however there has been interval increase in the size of the effusion and right lower lobe infiltrate on CT since 1/2023.  - DME new O2 set  up, advised to titrate supplemental oxygen to maintain saturations in the low 90s  - Continue prednisone taper as prescribed by Dr. Arredondo, advised to not taper below 5 mg dose until reassessed.  -She does feel her swelling is getting better on Lasix, will continue current dose that she is tolerating well 20 mg daily.           Relevant Medications    furosemide (LASIX) 20 MG Tab    Other Relevant Orders    Multiple Oximetry    DME O2 New Set Up    Malignant neoplasm of breast metastatic to bone (HCC)     S/p mastectomy 2019  Recently started exemestane and everolimus, stopped due to side effects  Closely followed by Dr Arredondo          Return in about 6 weeks (around 5/12/2023).     This note was generated using voice recognition software which has a chance of producing errors of grammar and possibly content.  I have made every reasonable attempt to find and correct any obvious errors, but it should be expected that some may not be found prior to finalization of this note.    Time spent in record review prior to patient arrival, reviewing results, and in face-to-face encounter totaled 50 min, excluding any procedures if performed.  __________  Charan Luis MD  Pulmonary and Critical Care Medicine  [facility]

## 2023-04-01 NOTE — ASSESSMENT & PLAN NOTE
There has been interval increase in the RLL infiltrate and right effusion since January 2023.  There has been no symptomatic or radiographic improvement on antibiotics or steroids. This is concerning for potential pulmonary metastases with either a reactive or malignant pleural effusion.    -- Reviewed and compared CT imaging together during visit.  It is clear that the R effusion has increased in size since January and her symptoms have worsened as well.  It is a moderately sized effusion.  She may obtain some symptomatic relief with thoracentesis.  This would also provide diagnostic value if cytology confirms malignant cells.  Risk/benefits/alternatives discussed and she agreed for IR thoracentesis with anesthesia.  Orders placed.

## 2023-04-01 NOTE — ASSESSMENT & PLAN NOTE
Increased infiltrate in the right lower lobe on interval imaging from January to March 2023.  Refractory to antibiotics and steroids.  Concern for pulmonary metastases.  Plan of care as outlined above.

## 2023-04-01 NOTE — ASSESSMENT & PLAN NOTE
Multifactorial due to likely some element of pulmonary edema and symptomatic anemia, unclear how much the pleural effusion is contributing to this however there has been interval increase in the size of the effusion and right lower lobe infiltrate on CT since 1/2023.  - DME new O2 set up, advised to titrate supplemental oxygen to maintain saturations in the low 90s  - Continue prednisone taper as prescribed by Dr. Arredondo, advised to not taper below 5 mg dose until reassessed.  -She does feel her swelling is getting better on Lasix, will continue current dose that she is tolerating well 20 mg daily.

## 2023-04-01 NOTE — ASSESSMENT & PLAN NOTE
S/p mastectomy 2019  Recently started exemestane and everolimus, stopped due to side effects  Closely followed by Dr Arredondo

## 2023-04-04 NOTE — TELEPHONE ENCOUNTER
Confirmed with pt date and time of appt on 4/8 for cbc possible transfusion. Pt and daughter verbalized understanding and will be at appt, directions reviewed for [facility].

## 2023-04-07 NOTE — PROGRESS NOTES
[facility] Nursing Note      Right Sided Thoracentesis done by Dr. Escalante; posterior Right posterior chest wall access site;     Pre-procedure lung sounds assessment - RUL clear RML clear, RLL dim, CHARLI clear, LLL dim    Post assessment lung sounds - RUL clear RML clear, RLL dim, CHARLI clear, LLL dim    220 mL obtained and specimen sent to lab for cytology.  pt tolerated the procedure well; pt hemodynamically stable pre/intra/post procedure; all questions and concerns answered prior to d/c; pt d/c home.

## 2023-04-08 NOTE — PROGRESS NOTES
Pt arrived ambulatory, accompanied by her daughter, for cbc/possible blood. POC discussed and pt verbalized understanding. Labs drawn from RAC with 23G butterfly needle x1 attempt without difficulty; site covered with sterile gauze/coban and pt tolerated well. Hgb 8.9 and platelet count 109. Pt does not meet parameters for blood products. Pt reports she will be following up with ordering provider for continued plan of care. Pt discharged ambulatory to self care in [facility].

## 2023-04-12 NOTE — TELEPHONE ENCOUNTER
VOICEMAIL: 04/11/23  1. Caller Name: Rodrick                      Call Back Number: [phone] (home)      2. Message: Pt called and lm. Requesting results of her Thoracent. Does she need to follow up with Dr Luis?     3. Patient approves office to leave a detailed voicemail/MyChart message: N\A

## 2023-04-17 NOTE — TELEPHONE ENCOUNTER
"Called and discussed results of thoracentesis/cytology + metastatic breast CA    Thoracentesis did help slightly with dyspnea, however states feeling \"really good\" now off chemo, feeling better than she has in the last 7 months    Endorses clear phlegm cough, attributes it to being outside more with good weather and feeling better. Not using O2 consistently, however out of breath, not wheezing. Sats > 90%.     Plan:  -- Pred taper down to 10mg/day, continue  -- Discussed options of IPC, surveillance, scheduled thoracentesis; she wants to wait and watch.  -- f/u Dr Baird 5/12    __________  Charan Luis MD  Pulmonary and Critical Care Medicine  [facility]      "

## 2023-05-03 PROBLEM — J91.0 MALIGNANT PLEURAL EFFUSION: Status: ACTIVE | Noted: 2023-01-01

## 2023-05-03 NOTE — PROGRESS NOTES
Subjective   Medical oncology follow-up visit: 5/3/2023  Rodrick Howard is a 72 y.o. female who presents with metastatic breast cancer        HPI    Referring Physician: Taniya Heck MD  Primary Care:  None     Diagnosis: Metastatic ER positive breast cancer with extensive bone metastases     Chief Complaint: Patient seen today for evaluation for caner therapy with Everolimus and exemestane for breast cancer, for continued monitoring of symptoms and side effects of cancer treatments.      Oncology history of presenting illness:  Patient with a history of metastatic ER positive breast cancer who recently moved to [place] to live with her daughter and establish care here.  Her history dates back to 2018 when she noticed nipple inversion but did not seek out medical care until October 2019  When she found a large breast mass in the left breast.  She did undergo biopsy which showed invasive ductal carcinoma with lobular features ER/DE positive, HER2 negative.  She did undergo a left mastectomy which showed an 8 cm lesion with 13/13 lymph nodes positive.  She had a CT scan postsurgery which showed diffuse bony disease and the patient declined biopsy at that time.  She was started on letrozole which she continued on until January 2021 and found to have progressive disease with increasing tumor markers.  She was then started on Faslodex and tolerated this poorly and quickly discontinued this medication.  She then went on tamoxifen for 1 year from February 2021 to February 2022 where patient had noticed further progression of disease in the bone and a rising CA 27.29.  She then was restarted on letrozole and Ibrance in February 2022 and had been doing reasonably well with dose of 100 mg of Ibrance 2 out of 4 weeks.  In preparation for her moved to [place] she had recent imaging including a bone scan on 9/7/2022 which showed stable osseous metastatic disease in the ribs, sternum, calvarium, femurs and proximal humeri,  and thoracic spine.  She had a CT scan of the chest, abdomen and pelvis at that time which showed a 3 mm nodule in the posterior right upper lobe and right axillary adenopathy which was smaller.  Diffuse sclerotic bony mets have not changed.  Her CA 27.29 has been rising but apparently the last 1 taken in September 2022 showed a drop of 400 points from around 2000.  She did have a history of left axillary vein clot and is on Eliquis for this.  She also has left arm lymphedema.  She does not take any narcotic pain medication and tries to use as little medication as possible.  She does have severe anxiety and distress over the move and takes an occasional Valium for this. A [facility] comprehensive profile for genomics was done earlier this year and showed ER positivity and a PI 3 kinase mutation potentially actionable by [facility].     Interval histories:  Interval history 10/26/2022: She had some difficulty getting her Ibrance to her office but this was finally solved and she is back on schedule with 3-week on 1 week off.  In the past she has occasionally had to delay an extra week for fatigue and nausea but so far she is doing well with this cycle.  Lab tests with CBC are stable with her CA 27-29 2300 essentially stable.  We repeated the ultrasound on the left arm which showed patent veins so Eliquis was discontinued.  She went to lymphedema treatment and is now using a sleeve and compression massages to help her lymphedema.    Interval history 12/2/2022: She has been tolerating her Ibrance and letrozole okay except for increasing fatigue mainly in the afternoon.  She remains independent of ADLs.  She has had for the past 2 weeks bronchial infection and a chronic cough with light gray sputum.  She has not had any fever, shortness of breath, sweats or hemoptysis.  He has some nasal congestion as well.  Lymphedema is stable.  Tumor markers are up about 5% within the range of variation.     Interval history 1/4/2023: She has  not done well over the past month.  She continues on Ibrance and letrozole but has had increasing fatigue.  She is also noted new nodules in her chest wall particularly in the left mastectomy scar but also above and below it which are itchy.  He has nausea also attributable to the Ibrance which ondansetron helps.  CA 27-29 increased to the 3400 range which represents a substantial worsening.  Her hemoglobin remained fairly stable at 10.1 but she feels weaker and has less exercise tolerance.  She also had another sinus infection but this has cleared.     Interval history 1/17/2023: She had a staging work-up which included CT of the chest abdomen pelvis demonstrating extensive diffuse predominantly sclerotic osseous metastases with a prominent lytic lesion in T3 in the manubrium.  No visceral involvement was noted.  Bone scan showed diffuse osseous metastatic disease without further characterization.  Overall her new subcutaneous nodules and the bony disease suggests progression without new organ involvement except for the skin.  The CT scan also bronchial wall thickening and mucous plugging in the right lower lobe.  She is more symptomatic from a lung perspective and gets short of breath with walking.  She is also coughing a lot.  I am going to send her to pulmonary for further evaluation and give her an albuterol inhaler in the interim.     Interval history 3/2/2023: She started exemestane and then everolimus about a month ago.  She has had substantial fatigue from both drugs but no mucositis.  She is doing a Decadron mouthwash relatively faithfully.  1 week ago she began developing increased cough and productive green sputum that was thick.  She had no fever.  She went to urgent care several days ago and was felt to have a potential left-sided pneumonia based on physical exam.  No x-ray was done.  She was started on Augmentin and antibiotic with some improvement in her sputum production.  She continues to cough a  lot but has used cough medication only intermittently.  She has used guaifenesin with some benefit.  Her O2 saturations have been good.  In the last 24 hours she has developed some diarrhea.  Labs from 2/28/2022 showed improved granulocyte count and were otherwise stable.  Her CA 27-29 is still pending but CEA was down slightly.  The nodules on the chest wall appear to have flattened out somewhat.     Interval history 03/08/23 (Jannie, APRN):  Patient continues to have bronchitis symptoms.  She is not coughing as much as she had previously.  She stated sputum previously was green however now it is clear.  She does continue to have significant shortness of breath even with just communicating.  Fatigue is quite severe.  She states that she does not have an appetite.  She has tested multiple times that she if she had COVID and has been negative.  She was told to take Mucinex DM but that is causing significant dryness.  She is also taking Zyrtec which she believes helps with postnasal drip.  She is using humidifier as well as a steam within the bathroom to help break up the mucus.  She is using her inhaler as directed.  When she arrived to the office her oxygen saturation was 83% but then after a while it did go up to 93%.  The cough is still quite severe but stated she was able to sleep at least 3 hours straight last night for the first time.  She did complete the antibiotics.  She had some diarrhea from the antibiotics but that is resolved.  She is continuing to take her everolimus and her cancer antigen levels continue to trend down.  Discussed this with the patient today.    Interval history 03/20/23 (Jannie, APRN):  Patient was seen by pulmonary last week on 3/13/2023 after request for revisit due to pulmonary issues.  Per Dr. Luis, pulmonologist he is concerned that patient was having fluid overload and he started her on Lasix 20 mg p.o. daily.  He is requesting patient have follow-up today to see how she is  doing with regards to her symptoms.    Per patient she stopped all of her breast cancer medications including everolimus and the exemestane last Monday, 3/13/2023 due to tolerability.  Was that day as well that she was started on the Lasix.  Since stopping the medication and starting the Lasix she has had significant improvement.  She has noticed the edema has improved but still noted in her upper and lower extremities.  She does note that she is breathing much better and she definitely has a better appetite.  She feels like she is moving around better as well at home.  She does state that some days she has increased urination another days or less.  She is limiting her water intake as well per Dr. Luis's recommendation.  She stated that she does not normally swell on the right side so she was extremely concerned and did present to the emergency department at [facility] within the last 2 weeks.  She stated they did an ultrasound to rule out a blood clot which was negative.  She has noticed that she does tend to get winded at times with activity and utilizes the albuterol as needed.  She did take last dose of her steroid last night.  She had experienced significant restless leg syndrome which she believes it is related to the steroid.  She had to take her Valium tablet in order to sleep because of this.    Patient is very concerned that her symptoms were caused by the chemotherapy pill, everolimus for her breast cancer.  She is not interested in taking this medication as her quality of life was quite poor.  She plans to discuss this with Dr. Arredondo at her next visit scheduled next week on 3/28/2023.    Interval history 3/28/2023: She stopped everolimus and exemestane few weeks ago and is feeling slowly better.  She does have severe fatigue and her hemoglobin continues to decrease as do her platelets with normal cessation of her white blood cell count.  She denies any cough or sputum production.  Her edema  is slightly better on Lasix but she is still swollen.  Appetite remains impaired.  Not feel that she can tolerate any antineoplastic therapy at the moment.  CBC shows her hemoglobin now 7.8.  She felt much better on the prednisone but when she stopped it she crashed and has decreased appetite and more fatigue.    Interval history 5/3/2023: Increasing shortness of breath and was evaluated by Dr. Luis.  Enlarging right pleural effusion was noted.  He underwent a thoracentesis on 3/31/2023 for which 250 mL withdrawn.  Cytology was positive for metastatic breast cancer.  She did better after transfusion and had more energy but is getting more tired again now.  She also has noted some recurrence of her shortness of breath.  Overall she is still managing on her own.  She does have generalized itching which is not relieved by Benadryl.  She is planning a trip to [place] with her family later this month and would like to be in the best shape possible for this.  She is willing to try Elacestrant after she gets back from the trip.  She is not ready for hospice yet.  Her swelling is better on diuretics.       Treatment history:  10/2019: Diagnosis of left breast cancer s/p ricardo with questionable diffuse bony disease, not confirmed  11/2019 - 01/2021: Letrozole   01/2021: Progression of disease - Faslodex - not tolerated  02/2021 - 02/2022: Tamoxifen  02/2022: Progression of disease - Letrozole and Ibrance 100 mg  09/2022: Established with Dr. Arredondo  01/2023: Progression of disease   02/2023: Started Exemestane and Everolimus  03/13/23: Patient stopped the Exemestane and Everolimus own d/t symptoms      Allergies   Allergen Reactions    Codeine Vomiting and Nausea    Hydrocodone Vomiting and Nausea    Morphine Unspecified     Pt reports that she just does not want it    Oxycodone Vomiting and Nausea     Current Outpatient Medications on File Prior to Encounter   Medication Sig Dispense Refill    furosemide (LASIX) 20  MG Tab Take 1 Tablet by mouth every day. Pt started on 3/13/2023 for 7 day course 60 Tablet 1    acetaminophen (TYLENOL) 650 MG CR tablet Take 650 mg by mouth every day.      ELDERBERRY PO Take 1 Tablet by mouth see administration instructions. Pt takes sporidially (OTC gummy)      Multiple Vitamins-Minerals (MULTI ADULT GUMMIES PO) Take 2 Tablets by mouth every day.      predniSONE (DELTASONE) 5 MG Tab Take two tablets twice a day in the am and pm for 10 days, then two tablets in am and one tablet in pm for 10 days, then one tablet in am and one tablet in pm for 10 days, then one tablet daily 90 Tablet 1    prochlorperazine (COMPAZINE) 10 MG Tab Take 1 Tablet by mouth every 6 hours as needed for Nausea/Vomiting. 30 Tablet 3    albuterol 108 (90 Base) MCG/ACT Aero Soln inhalation aerosol Inhale 2 Puffs every four hours as needed for Shortness of Breath. 1 Each 3    Everolimus 5 MG Tab Take 5mg orally once daily (Patient taking differently: Take 5 mg by mouth every day. Take 5mg orally once daily) 30 Tablet 3    ondansetron (ZOFRAN ODT) 8 MG TABLET DISPERSIBLE Take 1 Tablet by mouth every 8 hours as needed for Nausea for up to 60 doses. 60 Tablet 1    gabapentin (NEURONTIN) 300 MG Cap Take 1 Capsule by mouth every evening. 90 Capsule 3    doxycycline (VIBRAMYCIN) 100 MG Tab Take 100 mg by mouth 2 times a day. Pt started on 2/28/2023 for 7 day course (Patient not taking: Reported on 4/8/2023)      exemestane (AROMASIN) 25 MG tablet Take 1 tablet by mouth every day. (Patient not taking: Reported on 4/8/2023) 30 Tablet 5     No current facility-administered medications on file prior to encounter.       Review of Systems   Constitutional:  Positive for malaise/fatigue. Negative for chills, fever and weight loss.   Respiratory:  Positive for cough (mild) and shortness of breath.    Cardiovascular:  Negative for chest pain and palpitations.   Gastrointestinal:  Negative for constipation, diarrhea, heartburn, nausea and  "vomiting.   Genitourinary:  Positive for frequency (r/t Lasix). Negative for dysuria.   Skin:  Negative for itching and rash.   Neurological:  Negative for dizziness and headaches.            Objective     BP (!) 151/98   Pulse 91   Temp 36.8 °C (98.2 °F) (Temporal)   Resp 19   Ht 1.499 m (4' 11\")   Wt 103 kg (226 lb 10.1 oz)   SpO2 93%   BMI 45.77 kg/m²      Physical Exam  Vitals reviewed.   Constitutional:       General: She is not in acute distress.     Appearance: Normal appearance. She is not diaphoretic.   HENT:      Head: Normocephalic and atraumatic.   Cardiovascular:      Rate and Rhythm: Normal rate and regular rhythm.      Heart sounds: Normal heart sounds. No murmur heard.    No friction rub. No gallop.   Pulmonary:      Effort: Pulmonary effort is normal. No respiratory distress.      Breath sounds: Examination of the right-lower field reveals decreased breath sounds. Decreased breath sounds present. No wheezing.   Abdominal:      General: Bowel sounds are normal. There is no distension.      Palpations: Abdomen is soft.      Tenderness: There is no abdominal tenderness.   Musculoskeletal:         General: Swelling (bilateral upper and lower extremities) present. No tenderness. Normal range of motion.   Skin:     General: Skin is warm and dry.   Neurological:      Mental Status: She is alert and oriented to person, place, and time.   Psychiatric:         Mood and Affect: Mood normal.         Behavior: Behavior normal.           Latest Reference Range & Units 03/08/23 16:00   WBC 4.8 - 10.8 K/uL 3.3 (L)   RBC 4.20 - 5.40 M/uL 2.50 (L)   Hemoglobin 12.0 - 16.0 g/dL 8.3 (L)   Hematocrit 37.0 - 47.0 % 25.7 (L)   MCV 81.4 - 97.8 fL 102.8 (H)   MCH 27.0 - 33.0 pg 33.2 (H)   MCHC 33.6 - 35.0 g/dL 32.3 (L)   RDW 35.9 - 50.0 fL 58.9 (H)   Platelet Count 164 - 446 K/uL 127 (L)   MPV 9.0 - 12.9 fL 10.1   Neutrophils-Polys 44.00 - 72.00 % 55.70   Neutrophils (Absolute) 2.00 - 7.15 K/uL 1.86 (L)   Lymphocytes " 22.00 - 41.00 % 33.80   Lymphs (Absolute) 1.00 - 4.80 K/uL 1.13   Monocytes 0.00 - 13.40 % 5.70   Monos (Absolute) 0.00 - 0.85 K/uL 0.19   Eosinophils 0.00 - 6.90 % 1.80   Eos (Absolute) 0.00 - 0.51 K/uL 0.06   Basophils 0.00 - 1.80 % 0.60   Baso (Absolute) 0.00 - 0.12 K/uL 0.02   Immature Granulocytes 0.00 - 0.90 % 2.40 (H)   Immature Granulocytes (abs) 0.00 - 0.11 K/uL 0.08   Nucleated RBC /100 WBC 0.60   NRBC (Absolute) K/uL 0.02                Assessment & Plan         Impression:  1.  Metastatic ER positive breast cancer with extensive bone metastases, previously on letrozole and Ibrance with new onset of multiple subcutaneous and cutaneous nodules, increasing symptoms of fatigue and nausea, increasing CA 27-29.  All consistent with progression of disease, confirmed by extensive sclerotic metastases on CT and bone scan 1/23. Started exemestane and everolimus 2/23 and discontinued d/t poor tolerance.  Now rising CEA CA 27-29 pending and new malignant pleural effusion consistent with progression of disease  2.  Left arm lymphedema. Stable  3.  History of left venous thrombosis in the axillary area. Negative ultrasound now off anticoagulation  4.  Situational anxiety. She will use Valium at her discretion. Refill provided today.   5.  Bronchitis recurrent and worse with CT findings of mucous plugging and increasing symptomatology  6.  Nausea secondary to Ibrance.  Resolved  7.  Poor tolerance of fulvestrant with hives necessitating discontinuation after 2 doses  8.  Cough and shortness of breath markedly improved  9. Anasarca improved- will continue Lasix 20 mg PO daily  10.  Chemotherapy-induced anemia worse        Plan:    We will recheck an ultrasound of her right lung in a week and we tapped her if necessary.  I am also going to try to get her 1 unit of packed red blood cells given her borderline anemia needs to travel in a plane and worsening shortness of breath.  We are giving Atarax for itching.  I will see  her back in 4 weeks for routine follow-up.  We will order the elacestrant in the meantime/    Please note that this dictation was created using voice recognition software. I have made every reasonable attempt to correct obvious errors, but I expect that there are errors of grammar and possibly content that I did not discover before finalizing the note.

## 2023-05-05 NOTE — ADDENDUM NOTE
Encounter addended by: Eleni Stephens, Med Ass't on: 5/5/2023 8:46 AM   Actions taken: Charge Capture section accepted

## 2023-05-05 NOTE — ADDENDUM NOTE
Encounter addended by: Nathan Arredondo M.D. on: 5/4/2023 5:01 PM   Actions taken: Order list changed, Diagnosis association updated

## 2023-05-11 NOTE — PROGRESS NOTES
"Patient to [facility] for 1 unit of PRBC's. PIV inserted into right AC, flushed with + blood return, lab drawn. Premeds given. VS taken per protocol. BP (!) 140/77   Pulse 68   Temp 36.2 °C (97.1 °F) (Temporal)   Resp 18   Ht 1.53 m (5' 0.24\")   Wt 102 kg (224 lb 6.9 oz)   SpO2 93%   BMI 43.49 kg/m²   No s/s of infusion reaction. PIV flushed with + blood return and removed. Gauze and coban applied as a dressing. Patient to home w family.   "

## 2023-05-11 NOTE — TELEPHONE ENCOUNTER
Dr Luis, called and spoke with pt on 04/17/23(please refer to telephone encounter dos 04/17/23 for more information)

## 2023-05-12 NOTE — PROGRESS NOTES
US guided therapeutic thoracentesis done by Dr. James;(no H&P required as this is a NON SEDATION procedure) right mid posterior back access site; dressing CDI; 600 mL obtained and discarded.  RN at bedside, patient stated that she was having trouble catching her breath, thoracentesis drainage was halted, a small amount of blood was noticed in thoracentesis line. Called Dr. James, at bedside. Another ultrasound was performed with no acute bleeding noted. Dr. James removed the catheter, intact. Placed gauze and tegaderm over the site. A stat portable chest x ray was ordered due to patient's complaint of pain and discomfort. Dr. James reviewed the chest x ray and stated that the patient could discharge home if stable.   Pt hemodynamically stable pre/intra/post procedure. All questions and concerns answered prior to d/c. Patient provided with all appropriate education for procedure. Pt d/c home.

## 2023-05-12 NOTE — Clinical Note
RN at bedside, patient stated that she was having trouble catching her breath, thoracentesis drainage was halted, blood noticed in thoracentesis line. Called Dr. James, at bedside. Another ultrasound was performed with no acute bleeding noted. Dr. James removed the catheter, intact. Placed gauze and tegaderm over the site. A stat portable chest x ray was ordered due to patient's complaint of pain and discomfort. Dr. James  reviewed the chest x ray and stated that the patient could discharge home.

## 2023-05-13 NOTE — PROGRESS NOTES
Pulmonary Clinic Note    Chief Complaint:  Chief Complaint   Patient presents with    Follow-Up     Acute respiratory failure with hypoxia. Last seen 03/31/23     Results     Thoracent. 04/07/23    Patient Question     Lasix     HPI:   Rodrick Howard is a very pleasant 72 y.o. female with history of ER+ stage IV breast ca s/p mastectomy 2019 and bone mets currently followed by Dr. Arredondo who presents to pulmonary clinic for malignant pleural effusion.   She has been followed by Dr. Luis from pulmonary and at last visit on 3/31/23, plan was for thoracentesis of R effusion for symptomatic and diagnostic purposes.   Pathology from R pleural fluid on 4/7/23 was positive for metastatic carcinoma c/w her known metastatic breast carcinoma.     5/12/23: she had thoracentesis earlier today w/removal of 600ml fluid, usually feels symptomatic improvement in a few hrs so unable to tell yet. She is going to [place] w/her family on vacation next week. Asking for options regarding management of recurrent effusions.       Past Medical History:   Diagnosis Date    Cough     Shortness of breath     Sputum production     Wheezing        No past surgical history on file.    Social History     Socioeconomic History    Marital status:      Spouse name: Not on file    Number of children: Not on file    Years of education: Not on file    Highest education level: Not on file   Occupational History    Not on file   Tobacco Use    Smoking status: Never    Smokeless tobacco: Never   Vaping Use    Vaping Use: Former    Substances: THC    Devices: Pre-filled or refillable cartridge   Substance and Sexual Activity    Alcohol use: Not Currently    Drug use: Yes     Types: Marijuana    Sexual activity: Not on file   Other Topics Concern    Not on file   Social History Narrative    Not on file     Social Determinants of Health     Financial Resource Strain: Not on file   Food Insecurity: Not on file   Transportation Needs: Not  on file   Physical Activity: Not on file   Stress: Not on file   Social Connections: Not on file   Intimate Partner Violence: Not on file   Housing Stability: Not on file          No family history on file.  There is no pertinent family history.     Current Outpatient Medications on File Prior to Visit   Medication Sig Dispense Refill    predniSONE (DELTASONE) 5 MG Tab Take 1 Tablet by mouth every day. 30 Tablet 0    hydrOXYzine HCl (ATARAX) 25 MG Tab Take 1 Tablet by mouth 3 times a day as needed for Itching. 30 Tablet 0    furosemide (LASIX) 20 MG Tab Take 1 Tablet by mouth every day. Pt started on 3/13/2023 for 7 day course 60 Tablet 1    acetaminophen (TYLENOL) 650 MG CR tablet Take 650 mg by mouth every day.      ELDERBERRY PO Take 1 Tablet by mouth see administration instructions. Pt takes sporidially (OTC gummy)      Multiple Vitamins-Minerals (MULTI ADULT GUMMIES PO) Take 2 Tablets by mouth every day.      prochlorperazine (COMPAZINE) 10 MG Tab Take 1 Tablet by mouth every 6 hours as needed for Nausea/Vomiting. 30 Tablet 3    albuterol 108 (90 Base) MCG/ACT Aero Soln inhalation aerosol Inhale 2 Puffs every four hours as needed for Shortness of Breath. 1 Each 3    ondansetron (ZOFRAN ODT) 8 MG TABLET DISPERSIBLE Take 1 Tablet by mouth every 8 hours as needed for Nausea for up to 60 doses. 60 Tablet 1    gabapentin (NEURONTIN) 300 MG Cap Take 1 Capsule by mouth every evening. 90 Capsule 3     No current facility-administered medications on file prior to visit.       Allergies: Codeine, Hydrocodone, Morphine, Oxycodone, and Percocet [oxycodone-acetaminophen]      ROS:   Review of Systems   Constitutional:  Negative for chills and fever.   HENT:  Negative for hearing loss.    Eyes:  Negative for discharge.   Respiratory:  Positive for shortness of breath. Negative for hemoptysis.    Cardiovascular:  Negative for chest pain.   Gastrointestinal:  Negative for nausea and vomiting.   Musculoskeletal:  Negative for  "falls.   Skin:  Negative for rash.   Neurological:  Negative for focal weakness.       Vitals:  /74 (BP Location: Right arm, Patient Position: Sitting, BP Cuff Size: Adult)   Pulse 93   Ht 1.53 m (5' 0.25\")   Wt 101 kg (223 lb)   SpO2 91%     Physical Exam:  Physical Exam  Vitals and nursing note reviewed.   Constitutional:       General: She is not in acute distress.  HENT:      Mouth/Throat:      Mouth: Mucous membranes are moist.   Eyes:      Conjunctiva/sclera: Conjunctivae normal.   Cardiovascular:      Rate and Rhythm: Normal rate and regular rhythm.   Pulmonary:      Effort: Pulmonary effort is normal. No respiratory distress.   Abdominal:      Palpations: Abdomen is soft.   Musculoskeletal:         General: No deformity or signs of injury.   Skin:     General: Skin is warm and dry.   Neurological:      General: No focal deficit present.      Mental Status: She is alert and oriented to person, place, and time.         Data:    Reviewed     Assessment/Plan:    Problem List Items Addressed This Visit       Malignant neoplasm of breast metastatic to bone (HCC)     Follow up with Dr. Arredondo           Relevant Orders    IR-THORACENTESIS PUNCTURE RIGHT    Pleural effusion on right     Malignant pleural effusion as noted by pathology in 4/2023 - has now needed 2 thoracentesis about 5 weeks apart. We discussed in detail the nature of her effusion and options for repeat thoracentesis vs tunneled pleural catheter for drainage depending on frequency of thoracentesis, symptomatic relief and ease of use/improvement to quality of life. We decided that at this time, we will monitor patient and see when her next thoracentesis is, if it's becoming more frequent and providing symptomatic benefit to draining the fluid, then she will consider pleurx catheter. For now, I will place a conditional order for thoracentesis.            Relevant Orders    IR-THORACENTESIS PUNCTURE RIGHT    Acute respiratory failure with " hypoxia (HCC)     Multifactorial from effusion, RLL infiltrate and potentially some pulmonary edema as noted previously    - lasix  - continue supplemental O2 - patient is compliant with and benefiting from supplemental O2  - prn thoracentesis as noted  - f/u with oncology                  Return 4-6 weeks w/Dr. Luis.     This note was generated using voice recognition software which has a chance of producing errors of grammar and possibly content.  I have made every reasonable attempt to find and correct any obvious errors, but it should be expected that some may not be found prior to finalization of this note.    Time spent in record review prior to patient arrival, reviewing results, and in face-to-face encounter totaled 45 min, excluding any procedures if performed.    Nia Baird MD  Pulmonary and Critical Care Medicine  [facility]

## 2023-05-14 NOTE — ASSESSMENT & PLAN NOTE
Multifactorial from effusion, RLL infiltrate and potentially some pulmonary edema as noted previously    - lasix  - continue supplemental O2 - patient is compliant with and benefiting from supplemental O2  - prn thoracentesis as noted  - f/u with oncology

## 2023-05-14 NOTE — ASSESSMENT & PLAN NOTE
Malignant pleural effusion as noted by pathology in 4/2023 - has now needed 2 thoracentesis about 5 weeks apart. We discussed in detail the nature of her effusion and options for repeat thoracentesis vs tunneled pleural catheter for drainage depending on frequency of thoracentesis, symptomatic relief and ease of use/improvement to quality of life. We decided that at this time, we will monitor patient and see when her next thoracentesis is, if it's becoming more frequent and providing symptomatic benefit to draining the fluid, then she will consider pleurx catheter. For now, I will place a conditional order for thoracentesis.

## 2023-05-30 NOTE — TELEPHONE ENCOUNTER
"RN from [facility] came to Medical Oncology  requesting patient demographics and H&P.  She states pt self referred to their organization.  This RN called pt follow up and spoke to pt's daughter Rowdy and patient ont he phone.  Pt's daughter states the patient has rapidly declined in her health over the past four days.  She states the patient will \"not eat and is hardly drinking fluids.\"  She states she will take a bite of food and a few sips of water.  She says she is very weak and can barely walk from the bedroom to bathroom. Patient's daughter states they self referred to hospice and that the patient wants to go on hospice. The patient states she does want to go on hospice and states \"I don't want to do anything that will cause more pain.\"  Informed patient and daughter Dr. Arredondo will be updated.    "

## 2023-07-06 NOTE — TELEPHONE ENCOUNTER
7-6-23 1st NO SHOW  Date of No Show: 7-5-23  Provider: Dr. Blair  Reason For Visit: FV  Outcome of call:  no answer LVM.  Left call back for scheduling [phone].